# Patient Record
Sex: MALE | Race: WHITE | NOT HISPANIC OR LATINO | Employment: OTHER | URBAN - METROPOLITAN AREA
[De-identification: names, ages, dates, MRNs, and addresses within clinical notes are randomized per-mention and may not be internally consistent; named-entity substitution may affect disease eponyms.]

---

## 2023-04-11 LAB — URINE CULTURE: NORMAL

## 2023-04-28 LAB
ALANINE AMINOTRANSFERASE (SGPT) (U/L) IN SER/PLAS: 19 U/L (ref 10–52)
ALBUMIN (G/DL) IN SER/PLAS: 4.3 G/DL (ref 3.4–5)
ALKALINE PHOSPHATASE (U/L) IN SER/PLAS: 110 U/L (ref 33–136)
ANION GAP IN SER/PLAS: 12 MMOL/L (ref 10–20)
ASPARTATE AMINOTRANSFERASE (SGOT) (U/L) IN SER/PLAS: 26 U/L (ref 9–39)
BASOPHILS (10*3/UL) IN BLOOD BY AUTOMATED COUNT: 0.04 X10E9/L (ref 0–0.1)
BASOPHILS/100 LEUKOCYTES IN BLOOD BY AUTOMATED COUNT: 0.6 % (ref 0–2)
BILIRUBIN TOTAL (MG/DL) IN SER/PLAS: 0.8 MG/DL (ref 0–1.2)
CALCIUM (MG/DL) IN SER/PLAS: 9.1 MG/DL (ref 8.6–10.6)
CARBON DIOXIDE, TOTAL (MMOL/L) IN SER/PLAS: 31 MMOL/L (ref 21–32)
CHLORIDE (MMOL/L) IN SER/PLAS: 104 MMOL/L (ref 98–107)
CHOLESTEROL (MG/DL) IN SER/PLAS: 151 MG/DL (ref 0–199)
CHOLESTEROL IN HDL (MG/DL) IN SER/PLAS: 45.4 MG/DL
CHOLESTEROL/HDL RATIO: 3.3
CREATININE (MG/DL) IN SER/PLAS: 0.77 MG/DL (ref 0.5–1.3)
EOSINOPHILS (10*3/UL) IN BLOOD BY AUTOMATED COUNT: 0.28 X10E9/L (ref 0–0.4)
EOSINOPHILS/100 LEUKOCYTES IN BLOOD BY AUTOMATED COUNT: 4.2 % (ref 0–6)
ERYTHROCYTE DISTRIBUTION WIDTH (RATIO) BY AUTOMATED COUNT: 13.7 % (ref 11.5–14.5)
ERYTHROCYTE MEAN CORPUSCULAR HEMOGLOBIN CONCENTRATION (G/DL) BY AUTOMATED: 32.5 G/DL (ref 32–36)
ERYTHROCYTE MEAN CORPUSCULAR VOLUME (FL) BY AUTOMATED COUNT: 92 FL (ref 80–100)
ERYTHROCYTES (10*6/UL) IN BLOOD BY AUTOMATED COUNT: 4.29 X10E12/L (ref 4.5–5.9)
GFR MALE: >90 ML/MIN/1.73M2
GLUCOSE (MG/DL) IN SER/PLAS: 91 MG/DL (ref 74–99)
HEMATOCRIT (%) IN BLOOD BY AUTOMATED COUNT: 39.4 % (ref 41–52)
HEMOGLOBIN (G/DL) IN BLOOD: 12.8 G/DL (ref 13.5–17.5)
IMMATURE GRANULOCYTES/100 LEUKOCYTES IN BLOOD BY AUTOMATED COUNT: 0.3 % (ref 0–0.9)
LDL: 81 MG/DL (ref 0–99)
LEUKOCYTES (10*3/UL) IN BLOOD BY AUTOMATED COUNT: 6.6 X10E9/L (ref 4.4–11.3)
LYMPHOCYTES (10*3/UL) IN BLOOD BY AUTOMATED COUNT: 2.1 X10E9/L (ref 0.8–3)
LYMPHOCYTES/100 LEUKOCYTES IN BLOOD BY AUTOMATED COUNT: 31.8 % (ref 13–44)
MONOCYTES (10*3/UL) IN BLOOD BY AUTOMATED COUNT: 0.5 X10E9/L (ref 0.05–0.8)
MONOCYTES/100 LEUKOCYTES IN BLOOD BY AUTOMATED COUNT: 7.6 % (ref 2–10)
NEUTROPHILS (10*3/UL) IN BLOOD BY AUTOMATED COUNT: 3.66 X10E9/L (ref 1.6–5.5)
NEUTROPHILS/100 LEUKOCYTES IN BLOOD BY AUTOMATED COUNT: 55.5 % (ref 40–80)
NRBC (PER 100 WBCS) BY AUTOMATED COUNT: 0 /100 WBC (ref 0–0)
PLATELETS (10*3/UL) IN BLOOD AUTOMATED COUNT: 229 X10E9/L (ref 150–450)
POTASSIUM (MMOL/L) IN SER/PLAS: 3.9 MMOL/L (ref 3.5–5.3)
PROSTATE SPECIFIC AG (NG/ML) IN SER/PLAS: <0.1 NG/ML (ref 0–4)
PROTEIN TOTAL: 6.2 G/DL (ref 6.4–8.2)
SODIUM (MMOL/L) IN SER/PLAS: 143 MMOL/L (ref 136–145)
TESTOSTERONE (NG/DL) IN SER/PLAS: <60 NG/DL (ref 240–1000)
TRIGLYCERIDE (MG/DL) IN SER/PLAS: 123 MG/DL (ref 0–149)
UREA NITROGEN (MG/DL) IN SER/PLAS: 18 MG/DL (ref 6–23)
VLDL: 25 MG/DL (ref 0–40)

## 2023-05-09 ENCOUNTER — HOSPITAL ENCOUNTER (OUTPATIENT)
Dept: DATA CONVERSION | Facility: HOSPITAL | Age: 73
End: 2023-05-09
Attending: UROLOGY | Admitting: UROLOGY
Payer: MEDICARE

## 2023-05-09 DIAGNOSIS — I35.0 NONRHEUMATIC AORTIC (VALVE) STENOSIS: ICD-10-CM

## 2023-05-09 DIAGNOSIS — N39.3 STRESS INCONTINENCE (FEMALE) (MALE): ICD-10-CM

## 2023-05-09 DIAGNOSIS — R32 UNSPECIFIED URINARY INCONTINENCE: ICD-10-CM

## 2023-05-09 DIAGNOSIS — C61 MALIGNANT NEOPLASM OF PROSTATE (MULTI): ICD-10-CM

## 2023-05-09 DIAGNOSIS — I25.10 ATHEROSCLEROTIC HEART DISEASE OF NATIVE CORONARY ARTERY WITHOUT ANGINA PECTORIS: ICD-10-CM

## 2023-05-09 DIAGNOSIS — N32.89 OTHER SPECIFIED DISORDERS OF BLADDER: ICD-10-CM

## 2023-05-09 DIAGNOSIS — I10 ESSENTIAL (PRIMARY) HYPERTENSION: ICD-10-CM

## 2023-05-09 DIAGNOSIS — I25.2 OLD MYOCARDIAL INFARCTION: ICD-10-CM

## 2023-05-09 DIAGNOSIS — E78.00 PURE HYPERCHOLESTEROLEMIA, UNSPECIFIED: ICD-10-CM

## 2023-05-09 DIAGNOSIS — N30.40 IRRADIATION CYSTITIS WITHOUT HEMATURIA: ICD-10-CM

## 2023-07-24 LAB
ALANINE AMINOTRANSFERASE (SGPT) (U/L) IN SER/PLAS: 14 U/L (ref 10–52)
ALBUMIN (G/DL) IN SER/PLAS: 4.3 G/DL (ref 3.4–5)
ALKALINE PHOSPHATASE (U/L) IN SER/PLAS: 93 U/L (ref 33–136)
ANION GAP IN SER/PLAS: 11 MMOL/L (ref 10–20)
ASPARTATE AMINOTRANSFERASE (SGOT) (U/L) IN SER/PLAS: 22 U/L (ref 9–39)
BASOPHILS (10*3/UL) IN BLOOD BY AUTOMATED COUNT: 0.03 X10E9/L (ref 0–0.1)
BASOPHILS/100 LEUKOCYTES IN BLOOD BY AUTOMATED COUNT: 0.4 % (ref 0–2)
BILIRUBIN TOTAL (MG/DL) IN SER/PLAS: 1.3 MG/DL (ref 0–1.2)
CALCIUM (MG/DL) IN SER/PLAS: 9.6 MG/DL (ref 8.6–10.6)
CARBON DIOXIDE, TOTAL (MMOL/L) IN SER/PLAS: 32 MMOL/L (ref 21–32)
CHLORIDE (MMOL/L) IN SER/PLAS: 104 MMOL/L (ref 98–107)
CREATININE (MG/DL) IN SER/PLAS: 0.74 MG/DL (ref 0.5–1.3)
EOSINOPHILS (10*3/UL) IN BLOOD BY AUTOMATED COUNT: 0.14 X10E9/L (ref 0–0.4)
EOSINOPHILS/100 LEUKOCYTES IN BLOOD BY AUTOMATED COUNT: 2.1 % (ref 0–6)
ERYTHROCYTE DISTRIBUTION WIDTH (RATIO) BY AUTOMATED COUNT: 13.3 % (ref 11.5–14.5)
ERYTHROCYTE MEAN CORPUSCULAR HEMOGLOBIN CONCENTRATION (G/DL) BY AUTOMATED: 32.6 G/DL (ref 32–36)
ERYTHROCYTE MEAN CORPUSCULAR VOLUME (FL) BY AUTOMATED COUNT: 92 FL (ref 80–100)
ERYTHROCYTES (10*6/UL) IN BLOOD BY AUTOMATED COUNT: 4.25 X10E12/L (ref 4.5–5.9)
GFR MALE: >90 ML/MIN/1.73M2
GLUCOSE (MG/DL) IN SER/PLAS: 114 MG/DL (ref 74–99)
HEMATOCRIT (%) IN BLOOD BY AUTOMATED COUNT: 39 % (ref 41–52)
HEMOGLOBIN (G/DL) IN BLOOD: 12.7 G/DL (ref 13.5–17.5)
IMMATURE GRANULOCYTES/100 LEUKOCYTES IN BLOOD BY AUTOMATED COUNT: 0.3 % (ref 0–0.9)
LEUKOCYTES (10*3/UL) IN BLOOD BY AUTOMATED COUNT: 6.8 X10E9/L (ref 4.4–11.3)
LYMPHOCYTES (10*3/UL) IN BLOOD BY AUTOMATED COUNT: 0.95 X10E9/L (ref 0.8–3)
LYMPHOCYTES/100 LEUKOCYTES IN BLOOD BY AUTOMATED COUNT: 14 % (ref 13–44)
MONOCYTES (10*3/UL) IN BLOOD BY AUTOMATED COUNT: 0.57 X10E9/L (ref 0.05–0.8)
MONOCYTES/100 LEUKOCYTES IN BLOOD BY AUTOMATED COUNT: 8.4 % (ref 2–10)
NEUTROPHILS (10*3/UL) IN BLOOD BY AUTOMATED COUNT: 5.1 X10E9/L (ref 1.6–5.5)
NEUTROPHILS/100 LEUKOCYTES IN BLOOD BY AUTOMATED COUNT: 74.8 % (ref 40–80)
NRBC (PER 100 WBCS) BY AUTOMATED COUNT: 0 /100 WBC (ref 0–0)
PLATELETS (10*3/UL) IN BLOOD AUTOMATED COUNT: 206 X10E9/L (ref 150–450)
POTASSIUM (MMOL/L) IN SER/PLAS: 4.5 MMOL/L (ref 3.5–5.3)
PROSTATE SPECIFIC AG (NG/ML) IN SER/PLAS: <0.1 NG/ML (ref 0–4)
PROTEIN TOTAL: 6.1 G/DL (ref 6.4–8.2)
SODIUM (MMOL/L) IN SER/PLAS: 142 MMOL/L (ref 136–145)
TESTOSTERONE (NG/DL) IN SER/PLAS: <60 NG/DL (ref 240–1000)
UREA NITROGEN (MG/DL) IN SER/PLAS: 16 MG/DL (ref 6–23)

## 2023-08-18 PROBLEM — N32.81 OVERACTIVE BLADDER: Status: ACTIVE | Noted: 2023-08-18

## 2023-08-18 PROBLEM — R06.09 EXERTIONAL DYSPNEA: Status: ACTIVE | Noted: 2023-08-18

## 2023-08-18 PROBLEM — I25.10 ARTERIOSCLEROTIC CORONARY ARTERY DISEASE: Status: ACTIVE | Noted: 2023-08-18

## 2023-08-18 PROBLEM — E66.9 OBESITY, CLASS I, BMI 30.0-34.9 (SEE ACTUAL BMI): Status: ACTIVE | Noted: 2023-08-18

## 2023-08-18 PROBLEM — R07.9 CHEST PAIN: Status: ACTIVE | Noted: 2023-08-18

## 2023-08-18 PROBLEM — E78.00 HYPERCHOLESTEROLEMIA: Status: ACTIVE | Noted: 2023-08-18

## 2023-08-18 PROBLEM — I35.0 AORTIC STENOSIS: Status: ACTIVE | Noted: 2023-08-18

## 2023-08-18 PROBLEM — N30.40 RADIATION CYSTITIS: Status: ACTIVE | Noted: 2023-08-18

## 2023-08-18 PROBLEM — I10 BENIGN ESSENTIAL HTN: Status: ACTIVE | Noted: 2023-08-18

## 2023-08-18 PROBLEM — R32 INCONTINENCE OF URINE: Status: ACTIVE | Noted: 2023-08-18

## 2023-08-18 PROBLEM — R07.89 CHEST PRESSURE: Status: ACTIVE | Noted: 2023-08-18

## 2023-08-18 PROBLEM — Z01.810 PRE-PROCEDURAL CARDIOVASCULAR EXAMINATION: Status: ACTIVE | Noted: 2023-08-18

## 2023-08-18 PROBLEM — R39.9 LOWER URINARY TRACT SYMPTOMS: Status: ACTIVE | Noted: 2023-08-18

## 2023-08-18 PROBLEM — R31.9 HEMATURIA: Status: ACTIVE | Noted: 2023-08-18

## 2023-08-18 PROBLEM — C61 MALIGNANT NEOPLASM OF PROSTATE (MULTI): Status: ACTIVE | Noted: 2023-08-18

## 2023-08-18 PROBLEM — I25.2 STATUS POST NON-ST ELEVATION MYOCARDIAL INFARCTION (NSTEMI): Status: ACTIVE | Noted: 2023-08-18

## 2023-08-18 PROBLEM — R63.0 POOR APPETITE: Status: ACTIVE | Noted: 2023-08-18

## 2023-08-18 PROBLEM — E66.811 OBESITY, CLASS I, BMI 30.0-34.9 (SEE ACTUAL BMI): Status: ACTIVE | Noted: 2023-08-18

## 2023-08-18 PROBLEM — R33.9 URINARY RETENTION: Status: ACTIVE | Noted: 2023-08-18

## 2023-08-18 PROBLEM — E08.311 ADVANCED DIABETIC MACULOPATHY WITH RETINOPATHY AND MACULAR EDEMA ASSOCIATED WITH DIABETES MELLITUS DUE TO UNDERLYING CONDITION (MULTI): Status: ACTIVE | Noted: 2023-08-18

## 2023-08-18 RX ORDER — ABIRATERONE ACETATE 250 MG/1
4 TABLET ORAL
COMMUNITY
Start: 2022-04-28 | End: 2023-10-09 | Stop reason: SDUPTHER

## 2023-08-18 RX ORDER — AMLODIPINE BESYLATE 5 MG/1
1 TABLET ORAL DAILY
COMMUNITY
End: 2024-03-18 | Stop reason: SDUPTHER

## 2023-08-18 RX ORDER — MIRABEGRON 50 MG/1
1 TABLET, FILM COATED, EXTENDED RELEASE ORAL DAILY
COMMUNITY
Start: 2023-05-08 | End: 2024-01-18 | Stop reason: WASHOUT

## 2023-08-18 RX ORDER — FUROSEMIDE 20 MG/1
1 TABLET ORAL DAILY
COMMUNITY
Start: 2023-02-16 | End: 2023-10-09 | Stop reason: ALTCHOICE

## 2023-08-18 RX ORDER — METOPROLOL SUCCINATE 50 MG/1
1 TABLET, EXTENDED RELEASE ORAL DAILY
COMMUNITY

## 2023-08-18 RX ORDER — KETOCONAZOLE 20 MG/G
1 CREAM TOPICAL 2 TIMES DAILY PRN
COMMUNITY
Start: 2023-01-23

## 2023-08-18 RX ORDER — CHOLECALCIFEROL (VITAMIN D3) 25 MCG
1 TABLET ORAL DAILY
COMMUNITY
End: 2023-10-18 | Stop reason: ALTCHOICE

## 2023-08-18 RX ORDER — OXYBUTYNIN CHLORIDE 5 MG/1
1 TABLET ORAL 4 TIMES DAILY
COMMUNITY
Start: 2022-10-15 | End: 2023-10-09 | Stop reason: ALTCHOICE

## 2023-08-18 RX ORDER — MULTIVIT-MIN/IRON/FOLIC ACID/K 45-800-120
1 CAPSULE ORAL
COMMUNITY
End: 2024-05-10 | Stop reason: WASHOUT

## 2023-08-18 RX ORDER — PANTOPRAZOLE SODIUM 40 MG/1
1 TABLET, DELAYED RELEASE ORAL DAILY
COMMUNITY
Start: 2023-04-13

## 2023-08-18 RX ORDER — HYDROCHLOROTHIAZIDE 12.5 MG/1
1 TABLET ORAL EVERY MORNING
COMMUNITY
Start: 2023-04-18 | End: 2023-10-09 | Stop reason: ALTCHOICE

## 2023-08-18 RX ORDER — CLOPIDOGREL BISULFATE 75 MG/1
1 TABLET ORAL DAILY
COMMUNITY
Start: 2022-10-10 | End: 2023-10-09 | Stop reason: ALTCHOICE

## 2023-08-18 RX ORDER — ATORVASTATIN CALCIUM 80 MG/1
1 TABLET, FILM COATED ORAL DAILY
COMMUNITY
End: 2024-05-10 | Stop reason: WASHOUT

## 2023-08-18 RX ORDER — PSEUDOEPHEDRINE HCL 30 MG
1 TABLET ORAL DAILY
COMMUNITY
End: 2023-10-18 | Stop reason: ALTCHOICE

## 2023-08-18 RX ORDER — SULFAMETHOXAZOLE AND TRIMETHOPRIM 800; 160 MG/1; MG/1
1 TABLET ORAL 2 TIMES DAILY
COMMUNITY
Start: 2022-09-26 | End: 2023-10-09 | Stop reason: ALTCHOICE

## 2023-08-18 RX ORDER — ASPIRIN 81 MG/1
1 TABLET ORAL DAILY
COMMUNITY
Start: 2022-07-27

## 2023-08-18 RX ORDER — POLYETHYLENE GLYCOL 3350 17 G/17G
17 POWDER, FOR SOLUTION ORAL AS NEEDED
COMMUNITY
Start: 2022-10-10

## 2023-08-18 RX ORDER — DOXYCYCLINE 100 MG/1
1 CAPSULE ORAL 2 TIMES DAILY
COMMUNITY
Start: 2023-01-26 | End: 2023-10-09 | Stop reason: ALTCHOICE

## 2023-08-18 RX ORDER — AMOXICILLIN AND CLAVULANATE POTASSIUM 875; 125 MG/1; MG/1
1 TABLET, FILM COATED ORAL EVERY 12 HOURS
COMMUNITY
Start: 2022-10-15 | End: 2023-10-09 | Stop reason: ALTCHOICE

## 2023-08-18 RX ORDER — MELOXICAM 15 MG/1
1 TABLET ORAL AS NEEDED
COMMUNITY

## 2023-08-18 RX ORDER — PENTOSAN POLYSULFATE SODIUM 100 MG/1
1 CAPSULE, GELATIN COATED ORAL 3 TIMES DAILY
COMMUNITY
Start: 2022-10-24 | End: 2023-10-09 | Stop reason: ALTCHOICE

## 2023-08-18 RX ORDER — DOCUSATE SODIUM 100 MG/1
1 CAPSULE, LIQUID FILLED ORAL 2 TIMES DAILY
COMMUNITY
Start: 2022-10-10

## 2023-08-18 RX ORDER — CIPROFLOXACIN 500 MG/1
1 TABLET ORAL 2 TIMES DAILY
COMMUNITY
Start: 2022-08-11 | End: 2023-10-09 | Stop reason: ALTCHOICE

## 2023-08-18 RX ORDER — AMLODIPINE BESYLATE 2.5 MG/1
1 TABLET ORAL DAILY
COMMUNITY
Start: 2023-02-16 | End: 2023-10-09

## 2023-08-18 RX ORDER — PREDNISONE 5 MG/1
1 TABLET ORAL 2 TIMES DAILY
COMMUNITY
Start: 2022-04-28 | End: 2023-10-18 | Stop reason: ALTCHOICE

## 2023-08-18 RX ORDER — NITROGLYCERIN 0.4 MG/1
1 TABLET SUBLINGUAL EVERY 5 MIN PRN
COMMUNITY

## 2023-09-07 VITALS — WEIGHT: 224.87 LBS | HEIGHT: 68 IN | BODY MASS INDEX: 34.08 KG/M2

## 2023-09-14 PROBLEM — E78.5 HYPERLIPOPROTEINEMIA: Status: ACTIVE | Noted: 2023-09-14

## 2023-09-14 PROBLEM — Z96.0 URINARY CATHETER PRESENT: Status: ACTIVE | Noted: 2023-09-14

## 2023-09-14 PROBLEM — R59.0 LYMPHADENOPATHY, ABDOMINAL: Status: ACTIVE | Noted: 2023-09-14

## 2023-09-14 PROBLEM — R10.9 ABDOMINAL PAIN: Status: ACTIVE | Noted: 2023-09-14

## 2023-09-14 PROBLEM — Z90.79 STATUS POST PROSTATECTOMY: Status: ACTIVE | Noted: 2023-09-14

## 2023-09-14 PROBLEM — D50.0 ANEMIA DUE TO BLOOD LOSS: Status: ACTIVE | Noted: 2023-09-14

## 2023-09-14 PROBLEM — Z04.9 CONDITION NOT FOUND: Status: ACTIVE | Noted: 2023-09-14

## 2023-09-14 PROBLEM — T83.9XXA FOLEY CATHETER PROBLEM (CMS-HCC): Status: ACTIVE | Noted: 2023-09-14

## 2023-09-14 PROBLEM — N32.89 BLADDER SPASMS: Status: ACTIVE | Noted: 2023-09-14

## 2023-09-14 PROBLEM — K80.20 CHOLELITHIASIS: Status: ACTIVE | Noted: 2023-09-14

## 2023-09-14 PROBLEM — T83.098A: Status: ACTIVE | Noted: 2023-09-14

## 2023-09-14 PROBLEM — N39.3 MALE URINARY STRESS INCONTINENCE: Status: ACTIVE | Noted: 2023-09-14

## 2023-09-14 PROBLEM — R79.89 LOW SERUM TESTOSTERONE LEVEL IN MALE: Status: ACTIVE | Noted: 2023-09-14

## 2023-09-14 PROBLEM — I21.4 NSTEMI (NON-ST ELEVATED MYOCARDIAL INFARCTION) (MULTI): Status: ACTIVE | Noted: 2023-09-14

## 2023-09-14 PROBLEM — R31.0 GROSS HEMATURIA: Status: ACTIVE | Noted: 2023-09-14

## 2023-09-14 PROBLEM — R59.0 LYMPHADENOPATHY, MEDIASTINAL: Status: ACTIVE | Noted: 2023-09-14

## 2023-09-14 RX ORDER — AA/PROT/LYSINE/METHIO/VIT C/B6 50-12.5 MG
10 TABLET ORAL DAILY
COMMUNITY

## 2023-09-14 RX ORDER — VITAMIN B COMPLEX
1 CAPSULE ORAL DAILY
COMMUNITY
End: 2023-10-18 | Stop reason: ALTCHOICE

## 2023-09-14 NOTE — H&P
History & Physical Reviewed:   I have reviewed the History and Physical dated:  09-May-2023   History and Physical reviewed and relevant findings noted. Patient examined to review pertinent physical  findings.: No significant changes   Home Medications Reviewed: no changes noted   Allergies Reviewed: no changes noted       ERAS (Enhanced Recovery After Surgery):  ·  ERAS Patient: no     Consent:   COVID-19 Consent:  ·  COVID-19 Risk Consent Surgeon has reviewed key risks related to the risk of ramon COVID-19 and if they contract COVID-19 what the risks are.     Attestation:   Note Completion:  I am a:  Resident/Fellow   Attending Attestation I saw and evaluated the patient.  I personally obtained the key and critical portions of the history and physical exam or was physically present for key and  critical portions performed by the resident/fellow. I reviewed the resident/fellow?s documentation and discussed the patient with the resident/fellow.  I agree with the resident/fellow?s medical decision making as documented in the note.     I personally evaluated the patient on 09-May-2023         Electronic Signatures:  Howard Cespedes (Resident))  (Signed 09-May-2023 06:08)   Authored: History & Physical Reviewed, ERAS, Consent,  Note Completion  Victor Manuel Tang)  (Signed 09-May-2023 12:23)   Authored: Note Completion   Co-Signer: History & Physical Reviewed, ERAS, Consent, Note Completion      Last Updated: 09-May-2023 12:23 by Victor Manuel Tang)

## 2023-09-18 LAB
ANION GAP IN SER/PLAS: 11 MMOL/L (ref 10–20)
CALCIUM (MG/DL) IN SER/PLAS: 9.1 MG/DL (ref 8.6–10.3)
CARBON DIOXIDE, TOTAL (MMOL/L) IN SER/PLAS: 29 MMOL/L (ref 21–32)
CHLORIDE (MMOL/L) IN SER/PLAS: 106 MMOL/L (ref 98–107)
CREATININE (MG/DL) IN SER/PLAS: 0.81 MG/DL (ref 0.5–1.3)
ERYTHROCYTE DISTRIBUTION WIDTH (RATIO) BY AUTOMATED COUNT: 13.2 % (ref 11.5–14.5)
ERYTHROCYTE MEAN CORPUSCULAR HEMOGLOBIN CONCENTRATION (G/DL) BY AUTOMATED: 32.5 G/DL (ref 32–36)
ERYTHROCYTE MEAN CORPUSCULAR VOLUME (FL) BY AUTOMATED COUNT: 92 FL (ref 80–100)
ERYTHROCYTES (10*6/UL) IN BLOOD BY AUTOMATED COUNT: 4.03 X10E12/L (ref 4.5–5.9)
GFR MALE: >90 ML/MIN/1.73M2
GLUCOSE (MG/DL) IN SER/PLAS: 114 MG/DL (ref 74–99)
HEMATOCRIT (%) IN BLOOD BY AUTOMATED COUNT: 36.9 % (ref 41–52)
HEMOGLOBIN (G/DL) IN BLOOD: 12 G/DL (ref 13.5–17.5)
LEUKOCYTES (10*3/UL) IN BLOOD BY AUTOMATED COUNT: 5.9 X10E9/L (ref 4.4–11.3)
PLATELETS (10*3/UL) IN BLOOD AUTOMATED COUNT: 171 X10E9/L (ref 150–450)
POTASSIUM (MMOL/L) IN SER/PLAS: 4.1 MMOL/L (ref 3.5–5.3)
SODIUM (MMOL/L) IN SER/PLAS: 142 MMOL/L (ref 136–145)
UREA NITROGEN (MG/DL) IN SER/PLAS: 16 MG/DL (ref 6–23)

## 2023-09-22 ENCOUNTER — HOSPITAL ENCOUNTER (OUTPATIENT)
Dept: DATA CONVERSION | Facility: HOSPITAL | Age: 73
End: 2023-09-22
Attending: INTERNAL MEDICINE | Admitting: INTERNAL MEDICINE
Payer: MEDICARE

## 2023-09-22 DIAGNOSIS — I25.110 ATHEROSCLEROTIC HEART DISEASE OF NATIVE CORONARY ARTERY WITH UNSTABLE ANGINA PECTORIS (MULTI): ICD-10-CM

## 2023-09-22 DIAGNOSIS — I25.10 ATHEROSCLEROTIC HEART DISEASE OF NATIVE CORONARY ARTERY WITHOUT ANGINA PECTORIS: ICD-10-CM

## 2023-09-22 DIAGNOSIS — R06.02 SHORTNESS OF BREATH: ICD-10-CM

## 2023-09-22 DIAGNOSIS — I08.0 RHEUMATIC DISORDERS OF BOTH MITRAL AND AORTIC VALVES: ICD-10-CM

## 2023-09-22 DIAGNOSIS — R06.00 DYSPNEA, UNSPECIFIED: ICD-10-CM

## 2023-09-22 DIAGNOSIS — R07.89 OTHER CHEST PAIN: ICD-10-CM

## 2023-09-22 DIAGNOSIS — E78.5 HYPERLIPIDEMIA, UNSPECIFIED: ICD-10-CM

## 2023-09-22 DIAGNOSIS — I10 ESSENTIAL (PRIMARY) HYPERTENSION: ICD-10-CM

## 2023-09-30 NOTE — H&P
History & Physical Reviewed:   I have reviewed the History and Physical dated:  18-Sep-2023   History and Physical reviewed and relevant findings noted. Patient examined to review pertinent physical  findings.: No significant changes   Home Medications Reviewed: no changes noted   Allergies Reviewed: no changes noted       Airway/Sedation Assessment:  ·  Emotional Status calm   ·  Neurologic alert & oriented x 3   ·  Respiratory clear to auscultation   ·  Cardiovascular rhythm & rate regular  + murmur   ·  Mouth Opening OK yes   ·  Neck Flexibility OK yes   ·  Loose Teeth no   ·  Oropharyngeal Classification Class III   ·  ASA PS Classification ASA III   ·  Sedation Plan moderate sedation       ERAS (Enhanced Recovery After Surgery):  ·  ERAS Patient: no     Consent:   COVID-19 Consent:  ·  COVID-19 Risk Consent Surgeon has reviewed key risks related to the risk of ramon COVID-19 and if they contract COVID-19 what the risks are.     Assessment/Plan:   ·  Assessment and Plan    Impression: shortness of breath on exertion, fatigue, hx of CAD with prior PCI    Plan: [facility]      Electronic Signatures:  Socorro Matthews (APRN-CNP)  (Signed 22-Sep-2023 08:38)   Authored: History & Physical Reviewed, Airway/Sedation,  ERAS, Consent, Assessment/Plan, Note Completion      Last Updated: 22-Sep-2023 08:38 by Socorro Matthews (APRN-CNP)

## 2023-10-02 NOTE — PROGRESS NOTES
"Counseling:  The patient was counseled regarding diagnostic results, instructions for management, risk factor reductions, prognosis, patient and family education, impressions, risks and benefits of treatment options and importance of compliance with treatment.      Chief Complaint:   The patient presents today for 3-week followup of exertional SOB, chest pressure and fatigue s/p LHC and echocardiogram.       History Of Present Illness:    Moises Graham is a 73 year old male patient who presents today for 3-week  followup of exertional SOB, chest pressure and fatigue s/p LHC and echocardiogram. His PMH is significant for advanced diabetic maculopathy, prostate CA, CAD with h/o NSTEMI s/p PCI of LAD 07/20/2022, HTN, and hyperlipidemia. The patient was last seen 09/18/2023 by Nancy Cruz NP, at which time the patient reported worsening exertional SOB, exertional chest pressure and fatigue. Based on this report, an echocardiogram and LHC was ordered. On 09/22/2023, echocardiogram demonstrated an EF of 60-65%. basal septal hypertrophy (sigmoid septum), mild MR, and aortic gradient appearing to be predominantly subaortic at the level of LVOT due to sigmoid shaped septum with an elevated flow across LVOT as suggested by elevated LVOT VTI, and LHC revealed mild non-obstructive CAD. Today, the patient states that he has recovered well from LHC; however, he continues to report exertional SOB, chest pressure and fatigue. He also reports BLE discomfort and intermittent claudication. He is able to use an Elliptical, but has difficulties using a treadmill s/t LE fatigue and discomfort. BP is stable at home. The patient is compliant with his prescribed medications.      Last Recorded Vitals:  Vitals:    10/09/23 1133   BP: 120/80   BP Location: Right arm   Pulse: 70   Weight: 102 kg (225 lb 9.6 oz)   Height: 1.727 m (5' 8\")     Past Surgical History:  He has a past surgical history that includes CT angio coronary art with " heartflow if score >30% (7/8/2022).      Social History:  He reports that he has never smoked. He has never used smokeless tobacco. He reports current alcohol use of about 4.0 standard drinks of alcohol per week. He reports that he does not use drugs.    Family History:  Family History   Family history unknown: Yes      Allergies:  Patient has no known allergies.    Outpatient Medications:  Current Outpatient Medications   Medication Instructions    abiraterone (Zytiga) 250 mg tablet TAKE FOUR (4) TABLETS BY MOUTH ONCE A DAY IN THE MORNING. NO FOOD OR DRINK 2 HOURS BEFORE OR 1 HOUR AFTER ADMINISTRATION.    amLODIPine (NORVASC) 1 mg, oral, Daily    aspirin 81 mg EC tablet 1 tablet, oral, Daily    atorvastatin (Lipitor) 80 mg tablet 1 tablet, oral, Daily    b complex vitamins capsule 1 capsule, oral, Daily    calcium citrate 250 mg calcium tablet 1 tablet, oral, Daily    cholecalciferol (Vitamin D-3) 25 MCG (1000 UT) tablet 1 tablet, oral, Daily    coenzyme Q-10 (CO Q-10) 10 mg, oral, Daily    docusate sodium (Colace) 100 mg capsule 1 capsule, oral, 2 times daily    ketoconazole (NIZOral) 2 % cream 1 Application, Topical, 2 times daily, Apply a thin layer to affected areas    meloxicam (Mobic) 15 mg tablet 1 tablet, oral, As needed, Monday, Wednesday, and Friday    metoprolol succinate XL (Toprol-XL) 50 mg 24 hr tablet 1 tablet, oral, Daily    multivitamin-min-iron-FA-vit K (Bariatric Multivitamins) 45 mg iron- 800 mcg-120 mcg capsule 1 capsule, oral    Myrbetriq 50 mg tablet extended release 24 hr 24 hr tablet 1 tablet, oral, Daily    nitroglycerin (Nitrostat) 0.4 mg SL tablet 1 tablet, oral, Every 5 min PRN    pantoprazole (ProtoNix) 40 mg EC tablet 1 tablet, oral, Daily    polyethylene glycol (GLYCOLAX, MIRALAX) 17 g, oral, Once    predniSONE (Deltasone) 10 mg tablet TAKE ONE (1) TABLET BY MOUTH ONCE A DAY    predniSONE (Deltasone) 5 mg tablet 1 tablet, oral, 2 times daily    rosuvastatin (CRESTOR) 40 mg, oral,  Daily, Finish atorvastain before starting    vitamin-B complex split tablet 1 half tablet, oral, Daily     Review of Systems   Constitutional: Positive for malaise/fatigue.   Cardiovascular:  Positive for chest pain (pressure), claudication (intermittent) and dyspnea on exertion.   All other systems reviewed and are negative.     Physical Exam:  Constitutional:       Appearance: Healthy appearance. Not in distress.   Neck:      Vascular: No JVR. JVD normal.   Pulmonary:      Effort: Pulmonary effort is normal.      Breath sounds: Normal breath sounds. No wheezing. No rhonchi. No rales.   Chest:      Chest wall: Not tender to palpatation.   Cardiovascular:      PMI at left midclavicular line. Normal rate. Regular rhythm. Normal S1. Normal S2.       Murmurs: There is no murmur.      No gallop.  No click. No rub.   Pulses:     Intact distal pulses.   Edema:     Peripheral edema absent.   Abdominal:      General: Bowel sounds are normal.      Palpations: Abdomen is soft.      Tenderness: There is no abdominal tenderness.   Musculoskeletal: Normal range of motion.         General: No tenderness. Skin:     General: Skin is warm and dry.   Neurological:      General: No focal deficit present.      Mental Status: Alert and oriented to person, place and time.        Last Labs:  CBC -  Lab Results   Component Value Date    WBC 5.9 09/18/2023    HGB 12.0 (L) 09/18/2023    HCT 36.9 (L) 09/18/2023    MCV 92 09/18/2023     09/18/2023       CMP -  Lab Results   Component Value Date    CALCIUM 9.1 09/18/2023    PROT 4.9 (L) 08/20/2023    ALBUMIN 2.9 (L) 08/20/2023    AST 32 08/20/2023    ALT 22 08/20/2023    ALKPHOS 93 08/20/2023    BILITOT 1.7 (H) 08/20/2023       LIPID PANEL -   Lab Results   Component Value Date    CHOL 151 04/28/2023    TRIG 123 04/28/2023    HDL 45.4 04/28/2023    CHHDL 3.3 04/28/2023    LDLF 81 04/28/2023    VLDL 25 04/28/2023       RENAL FUNCTION PANEL -   Lab Results   Component Value Date    GLUCOSE  114 (H) 09/18/2023     09/18/2023    K 4.1 09/18/2023     09/18/2023    CO2 29 09/18/2023    ANIONGAP 11 09/18/2023    BUN 16 09/18/2023    CREATININE 0.81 09/18/2023    GFRMALE >90 09/18/2023    CALCIUM 9.1 09/18/2023    ALBUMIN 2.9 (L) 08/20/2023        Lab Results   Component Value Date    BNP 56 06/21/2022       Last Cardiology Tests:  09/22/2023 - Cardiac Catheterization (LH)  1. Mild non-obstructive coronary artery disease.  2. Left Ventricular end-diastolic pressure = 7.  3. Normal LV filling pressures.    09/22/2023 - TTE  1. Left ventricular systolic function is normal with a 60-65% estimated ejection fraction.  2. Spectral Doppler shows an impaired relaxation pattern of left ventricular diastolic filling.  3. Moderately elevated right ventricular systolic pressure.  4. There is basal septal hypertrophy(sigmoid septum).  5. Mild mitral valve regurgitation.  6. Aortic valve area of 2.27 cm2 (1.06 indexed ZARA) by continuity equation. Peak velocity of 2.27 m/s across AV. Peak and mean gradient of 20 mmHg and 12 mmHg respectively across the AV. The gradient appears to be predominantly subaortic at the level of LVOT due to sigmoid shaped septum. There is elevated flow across LVOT as suggested by elevated LVOT VTI. Can consider repeat study with valsalva to evaluate for provoked gradients if clinically indicated. Aortic valve DI of 0.72 and strove volume index of 40 L/min/m2.    01/25/2023 - TTE  1. Left ventricular systolic function is normal with a 55-60% estimated ejection fraction.  2. Spectral Doppler shows a pseudonormal pattern of left ventricular diastolic filling.  3. There is moderate concentric left ventricular hypertrophy.  4. Mild aortic valve stenosis.    01/05/2023 - Stress Test  1. No clinical evidence for ischemia at maximal workload.  2. Normal perfusion without evidence of ischemia or prior infarct. Calculated ejection fraction is 66% without segmental wall motion abnormality seen.      07/20/2022 - Cardiac Catheterization (LH)  1. Severe 1-vessel CAD involving mid LAD (RFR 0.86).  2. Mid LAD bridge.  3. Successful PCI of hemodynamically significant mid LAD 70% stenosis (RFR 0.86) with 3.5 x 18 mm Resolute [place] stent post-dilated with 3.5 mm NC balloon.    06/15/2022 - Onco-Cardiology TTE  1. The left ventricular systolic function is normal with a 65-70% estimated ejection fraction.  2. Spectral Doppler shows an impaired relaxation pattern of left ventricular diastolic filling.  3. RVSP within normal limits.  4. Compared with the prior exam from 9/14/2016 the LV function remains unchanged. GLS was not assessed on the prior study.    06/29/2020 - Stress Test  1. Borderline ST response to moderate peak workload max. ETT. Occ. PVC's with exercise. Systolic and diastolic hypertensive response to exercise.   2. Normal stress myocardial perfusion imaging. Well-maintained left ventricular function. 64%     Diagnostic review: I have personally reviewed the result(s) of the Echocardiogram and C.     Assessment/Plan   1) CAD s/p PCI of LAD in 7/2022, HOCM  He had been having recurrent episodes of Hematuria on ASA/Plavix  It was decided previously that since he had a single small stent in a 3.5 mm vessel and his risks of anticoagulation is much higher than benefits  Plavix was previously discontinued   Continue aspirin 81 mg daily, rosuvastatin 40 mg daily, amlodipine 5 mg daily   Stress 01/25/2023 negative for ischemia  TTE 01/25/2023 with LVEF 55-60, moderate concentric LVH and mild AS  Seen by Nancy Cruz NP, 09/18/2023 - reporting exertional SOB, exertional chest pressure, fatigue - echo and [facility] ordered  TTE 09/22/2023 with LVEF 60-65%, basal septal hypertrophy (sigmoid septum). mild MR; aortic valve area of 2.27 cm2, peak velocity of 2.27 m/s across AV, peak and mean gradient of 20 mmHg and 12 mmHg respectively across the AV, gradient appears to be predominantly subaortic at the level  of LVOT due to sigmoid shaped septum. With elevated flow across LVOT as suggested by elevated LVOT VTI.   [facility] 09/22/2023 with mild non-obstructive CAD  Reports persistent exertional SOB, chest pressure and fatigue  Check cardiac MRI    2) Hyperlipidemia  Goal LDL <70  Atorvastatin previously discontinued  On rosuvastatin 40 mg daily   Lipid panel 06/2022 with elevated LDL of 103   Due for repeat lipid panel 12/2023    3) HTN  Stable  On amlodipine 5 mg (increased from 2.5 mg on 09/18/2023), furosemide 20 mg daily, HCTZ 12.5 mg once daily  Improvement in LE edema with the furosemide - requesting alternative s/t urinary frequency  Furosemide previously discontinued s/t urinary frequency   Patient can increase HCTZ up to 25 mg if leg swelling persists/does not improve, but to call our office if he does     4) BLE Discomfort and Intermittent Claudication   Able to use Elliptical  Unable to tolerate treadmill  Check DIANNE      Scribe Attestation  By signing my name below, I, Sharlene Calderon   attest that this documentation has been prepared under the direction and in the presence of Jayson Tang MD.

## 2023-10-02 NOTE — OP NOTE
Post Operative Note:     PreOp Diagnosis: Male postsurgical stress urinary  incontinence   Post-Procedure Diagnosis: Same   Procedure: 1.  Artificial urinary sphincter placemen  2. adjacent tissue transfer and complex closure   Surgeon: ARRON Tang MD   Resident/Fellow/Other Assistant: BELTRAN Cespedes MD   Anesthesia: General - ETT   I.V. Fluids: Per Anesthesia   Estimated Blood Loss (mL): 5cc   Blood Replacement: None   Specimen: no   Complications: none   Findings: Plump urethra measuring 5cm;  with  4.5cm cuff and 24cc in standard PRB   Patient Returned To/Condition: Returned to PACU in  stable condition   Urine Output: Lost to field   Drains and/or Catheters: none   Implants:  AUS     Operative Report Dictated:  Dictation: not applicable - note contains Operative  Report   Operative Report:    OPERATIVE DETAILS:  Preoperatively the patient received vancomycin, gentamicin (3 mg/kg), fluconazole, subcutaneous heparin.    The patient was brought to the operating suite, laid supine on the  table.  A preoperative huddle was performed as per institutional  protocol. General anesthesia was induced.    He was placed in dorsal lithotomy position, prepped and draped in  standard sterile fashion.  We placed a 16Fr catheter per urethra into the  bladder.  We made a midline perineal incision, and carried this down through  subcutaneous tissue. The bulbospongiosis muscle was identified and  divided in the midline.  Self-retaining retractors were placed to  expose the bulbar urethra.     The dorsolateral attachments of the bulbar urethra over the crura were  divided. Kern fascia was perforated and circumferential urethral  control was obtained.  The dorsal attachments were further divided to  enable enough space for cuff placement.  The catheter was removed and  the measurement of urethra circumference using umbilical tape was done here which was 5  cm. We elected to proceed with a 4.5 cm cuff placement.     The  cuff was prepared in the back table per the  's standard recommendations. The wound was packed and we then  proceeded our attention to the right groin and a right inguinal incision was  made and carried down to subcutaneous tissue.  This was carried down  through Luca's, then through the external oblique fascia.  A  1.5 cm fasciotomy was made here and the rectus muscle underneath was  encountered and it was split along the line of its fiber and the  retrorectus preperitoneal space was encountered. The space was created  here towards the ipsilateral shoulder that would allow for the balloon to  go in. The balloon was passed in this space and inflated with 24 mL of  injectable saline.      Fascia closure was done with interrupted 1-0 Vicryl sutures.  We then created a hiatus through the inguinal incision below luca's fascia, but above the external oblique fascia, and carried this down to the scrotum. The scrotal dartos  with Luca's interface was perforated and a nice pocket was created  in the right dependent hemiscrotum anterior to the testicles. Herein the  control pump was brought in the tubing exiting in the inguinal area.  Once all of our tubings were in the groin incision, we turned our attention back to the perineal incision where we placed the cuff around the proximal bulbar urethra and this seated quite nicely. We then passed the tubing from the cuff from the perineal  incision to  the inguinal incision tunneling it subcutaneously superficial to the  pelvic bones.  These connections were tested and were noted to be  pretty firm.  The wounds were copiously irrigated.  We  cycled the device multiple times.  We closed the groin incision with Luca's first followed by deep  dermal and skin sutures, which were all absorbable sutures.  We then  focused on the perineal incision.  At the perineal incision, we began  a complex closure for which we reconstructed the bulbospongiosis  muscle in the  midline to provide coverage over the implant. The subcutaneous tissue and dartos  were closed with a running Vicryl and the skin was closed with a  running Monocryl.  Dermabond was applied.  The device was cycled  multiple times then left in the deactivated position. We were content with the results. This concluded the procedure. Scrotal fluffs and jock strap were placed and drapes were taken down. The patient was then awoken from anesthesia after re-positioning  in transferred to PACU in stable condition..    Attestation:   Note Completion:  I am a: Resident/Fellow   Attending Attestation I was present for the entire procedure          Electronic Signatures:  Howard Cespedes (Resident))  (Signed 09-May-2023 16:24)   Authored: Post Operative Note, Note Completion  Victor Manuel Tang)  (Signed 11-May-2023 11:13)   Authored: Post Operative Note, Note Completion   Co-Signer: Post Operative Note, Note Completion      Last Updated: 11-May-2023 11:13 by Victor Manuel Tang)

## 2023-10-02 NOTE — PATIENT INSTRUCTIONS
Continue all current medications as prescribed.   Dr. Tang has ordered an MRI of your heart to further evaluate your heart function and structure.  Dr. Tang has also ordered a pressure measurement ultrasound of your legs.   Followup with Dr. Tang after the above tests.    If you have any questions or cardiac concerns, please call our office at [phone].

## 2023-10-09 ENCOUNTER — OFFICE VISIT (OUTPATIENT)
Dept: CARDIOLOGY | Facility: CLINIC | Age: 73
End: 2023-10-09
Payer: MEDICARE

## 2023-10-09 VITALS
BODY MASS INDEX: 34.19 KG/M2 | SYSTOLIC BLOOD PRESSURE: 120 MMHG | WEIGHT: 225.6 LBS | DIASTOLIC BLOOD PRESSURE: 80 MMHG | HEART RATE: 70 BPM | HEIGHT: 68 IN

## 2023-10-09 DIAGNOSIS — I73.9 CLAUDICATION (CMS-HCC): ICD-10-CM

## 2023-10-09 DIAGNOSIS — I42.1 HOCM (HYPERTROPHIC OBSTRUCTIVE CARDIOMYOPATHY) (MULTI): ICD-10-CM

## 2023-10-09 DIAGNOSIS — I25.10 ARTERIOSCLEROTIC CORONARY ARTERY DISEASE: Primary | ICD-10-CM

## 2023-10-09 PROCEDURE — 3008F BODY MASS INDEX DOCD: CPT | Performed by: INTERNAL MEDICINE

## 2023-10-09 PROCEDURE — 99213 OFFICE O/P EST LOW 20 MIN: CPT | Performed by: INTERNAL MEDICINE

## 2023-10-09 PROCEDURE — 1036F TOBACCO NON-USER: CPT | Performed by: INTERNAL MEDICINE

## 2023-10-09 PROCEDURE — 1160F RVW MEDS BY RX/DR IN RCRD: CPT | Performed by: INTERNAL MEDICINE

## 2023-10-09 PROCEDURE — 1159F MED LIST DOCD IN RCRD: CPT | Performed by: INTERNAL MEDICINE

## 2023-10-09 PROCEDURE — 3074F SYST BP LT 130 MM HG: CPT | Performed by: INTERNAL MEDICINE

## 2023-10-09 PROCEDURE — 3079F DIAST BP 80-89 MM HG: CPT | Performed by: INTERNAL MEDICINE

## 2023-10-09 RX ORDER — ROSUVASTATIN CALCIUM 40 MG/1
40 TABLET, COATED ORAL DAILY
COMMUNITY
Start: 2023-09-18 | End: 2024-03-15 | Stop reason: SDUPTHER

## 2023-10-09 RX ORDER — PHENAZOPYRIDINE HYDROCHLORIDE 200 MG/1
200 TABLET, FILM COATED ORAL 3 TIMES DAILY
COMMUNITY
Start: 2023-08-08 | End: 2023-10-09 | Stop reason: ALTCHOICE

## 2023-10-09 ASSESSMENT — ENCOUNTER SYMPTOMS
CLAUDICATION: 1
LOSS OF SENSATION IN FEET: 0
DEPRESSION: 0
OCCASIONAL FEELINGS OF UNSTEADINESS: 0
DYSPNEA ON EXERTION: 1

## 2023-10-09 NOTE — LETTER
October 9, 2023     Raghu Sotelo MD  [address] [facility] [address]    Patient: Moises Graham   YOB: 1950   Date of Visit: 10/9/2023       Dear Dr. Raghu Sotelo MD:    Thank you for referring Moises Graham to me for evaluation. Below are my notes for this consultation.  If you have questions, please do not hesitate to call me. I look forward to following your patient along with you.       Sincerely,     Jayson Tang MD      CC: No Recipients  ______________________________________________________________________________________    Counseling:  The patient was counseled regarding diagnostic results, instructions for management, risk factor reductions, prognosis, patient and family education, impressions, risks and benefits of treatment options and importance of compliance with treatment.      Chief Complaint:   The patient presents today for 3-week followup of exertional SOB, chest pressure and fatigue s/p LHC and echocardiogram.       History Of Present Illness:    Moises Graham is a 73 year old male patient who presents today for 3-week  followup of exertional SOB, chest pressure and fatigue s/p LHC and echocardiogram. His PMH is significant for advanced diabetic maculopathy, prostate CA, CAD with h/o NSTEMI s/p PCI of LAD 07/20/2022, HTN, and hyperlipidemia. The patient was last seen 09/18/2023 by Nancy Cruz NP, at which time the patient reported worsening exertional SOB, exertional chest pressure and fatigue. Based on this report, an echocardiogram and LHC was ordered. On 09/22/2023, echocardiogram demonstrated an EF of 60-65%. basal septal hypertrophy (sigmoid septum), mild MR, and aortic gradient appearing to be predominantly subaortic at the level of LVOT due to sigmoid shaped septum with an elevated flow across LVOT as suggested by elevated LVOT VTI, and LHC revealed mild non-obstructive CAD. Today, the patient states that he has recovered well from LHC; however, he continues to  "report exertional SOB, chest pressure and fatigue. He also reports BLE discomfort and intermittent claudication. He is able to use an Elliptical, but has difficulties using a treadmill s/t LE fatigue and discomfort. BP is stable at home. The patient is compliant with his prescribed medications.      Last Recorded Vitals:  Vitals:    10/09/23 1133   BP: 120/80   BP Location: Right arm   Pulse: 70   Weight: 102 kg (225 lb 9.6 oz)   Height: 1.727 m (5' 8\")     Past Surgical History:  He has a past surgical history that includes CT angio coronary art with heartflow if score >30% (7/8/2022).      Social History:  He reports that he has never smoked. He has never used smokeless tobacco. He reports current alcohol use of about 4.0 standard drinks of alcohol per week. He reports that he does not use drugs.    Family History:  Family History   Family history unknown: Yes      Allergies:  Patient has no known allergies.    Outpatient Medications:  Current Outpatient Medications   Medication Instructions   • abiraterone (Zytiga) 250 mg tablet TAKE FOUR (4) TABLETS BY MOUTH ONCE A DAY IN THE MORNING. NO FOOD OR DRINK 2 HOURS BEFORE OR 1 HOUR AFTER ADMINISTRATION.   • amLODIPine (NORVASC) 1 mg, oral, Daily   • aspirin 81 mg EC tablet 1 tablet, oral, Daily   • atorvastatin (Lipitor) 80 mg tablet 1 tablet, oral, Daily   • b complex vitamins capsule 1 capsule, oral, Daily   • calcium citrate 250 mg calcium tablet 1 tablet, oral, Daily   • cholecalciferol (Vitamin D-3) 25 MCG (1000 UT) tablet 1 tablet, oral, Daily   • coenzyme Q-10 (CO Q-10) 10 mg, oral, Daily   • docusate sodium (Colace) 100 mg capsule 1 capsule, oral, 2 times daily   • ketoconazole (NIZOral) 2 % cream 1 Application, Topical, 2 times daily, Apply a thin layer to affected areas   • meloxicam (Mobic) 15 mg tablet 1 tablet, oral, As needed, Monday, Wednesday, and Friday   • metoprolol succinate XL (Toprol-XL) 50 mg 24 hr tablet 1 tablet, oral, Daily   • " multivitamin-min-iron-FA-vit K (Bariatric Multivitamins) 45 mg iron- 800 mcg-120 mcg capsule 1 capsule, oral   • Myrbetriq 50 mg tablet extended release 24 hr 24 hr tablet 1 tablet, oral, Daily   • nitroglycerin (Nitrostat) 0.4 mg SL tablet 1 tablet, oral, Every 5 min PRN   • pantoprazole (ProtoNix) 40 mg EC tablet 1 tablet, oral, Daily   • polyethylene glycol (GLYCOLAX, MIRALAX) 17 g, oral, Once   • predniSONE (Deltasone) 10 mg tablet TAKE ONE (1) TABLET BY MOUTH ONCE A DAY   • predniSONE (Deltasone) 5 mg tablet 1 tablet, oral, 2 times daily   • rosuvastatin (CRESTOR) 40 mg, oral, Daily, Finish atorvastain before starting   • vitamin-B complex split tablet 1 half tablet, oral, Daily     Review of Systems   Constitutional: Positive for malaise/fatigue.   Cardiovascular:  Positive for chest pain (pressure), claudication (intermittent) and dyspnea on exertion.   All other systems reviewed and are negative.     Physical Exam:  Constitutional:       Appearance: Healthy appearance. Not in distress.   Neck:      Vascular: No JVR. JVD normal.   Pulmonary:      Effort: Pulmonary effort is normal.      Breath sounds: Normal breath sounds. No wheezing. No rhonchi. No rales.   Chest:      Chest wall: Not tender to palpatation.   Cardiovascular:      PMI at left midclavicular line. Normal rate. Regular rhythm. Normal S1. Normal S2.       Murmurs: There is no murmur.      No gallop.  No click. No rub.   Pulses:     Intact distal pulses.   Edema:     Peripheral edema absent.   Abdominal:      General: Bowel sounds are normal.      Palpations: Abdomen is soft.      Tenderness: There is no abdominal tenderness.   Musculoskeletal: Normal range of motion.         General: No tenderness. Skin:     General: Skin is warm and dry.   Neurological:      General: No focal deficit present.      Mental Status: Alert and oriented to person, place and time.        Last Labs:  CBC -  Lab Results   Component Value Date    WBC 5.9 09/18/2023     HGB 12.0 (L) 09/18/2023    HCT 36.9 (L) 09/18/2023    MCV 92 09/18/2023     09/18/2023       CMP -  Lab Results   Component Value Date    CALCIUM 9.1 09/18/2023    PROT 4.9 (L) 08/20/2023    ALBUMIN 2.9 (L) 08/20/2023    AST 32 08/20/2023    ALT 22 08/20/2023    ALKPHOS 93 08/20/2023    BILITOT 1.7 (H) 08/20/2023       LIPID PANEL -   Lab Results   Component Value Date    CHOL 151 04/28/2023    TRIG 123 04/28/2023    HDL 45.4 04/28/2023    CHHDL 3.3 04/28/2023    LDLF 81 04/28/2023    VLDL 25 04/28/2023       RENAL FUNCTION PANEL -   Lab Results   Component Value Date    GLUCOSE 114 (H) 09/18/2023     09/18/2023    K 4.1 09/18/2023     09/18/2023    CO2 29 09/18/2023    ANIONGAP 11 09/18/2023    BUN 16 09/18/2023    CREATININE 0.81 09/18/2023    GFRMALE >90 09/18/2023    CALCIUM 9.1 09/18/2023    ALBUMIN 2.9 (L) 08/20/2023        Lab Results   Component Value Date    BNP 56 06/21/2022       Last Cardiology Tests:  09/22/2023 - Cardiac Catheterization (LH)  1. Mild non-obstructive coronary artery disease.  2. Left Ventricular end-diastolic pressure = 7.  3. Normal LV filling pressures.    09/22/2023 - TTE  1. Left ventricular systolic function is normal with a 60-65% estimated ejection fraction.  2. Spectral Doppler shows an impaired relaxation pattern of left ventricular diastolic filling.  3. Moderately elevated right ventricular systolic pressure.  4. There is basal septal hypertrophy(sigmoid septum).  5. Mild mitral valve regurgitation.  6. Aortic valve area of 2.27 cm2 (1.06 indexed ZARA) by continuity equation. Peak velocity of 2.27 m/s across AV. Peak and mean gradient of 20 mmHg and 12 mmHg respectively across the AV. The gradient appears to be predominantly subaortic at the level of LVOT due to sigmoid shaped septum. There is elevated flow across LVOT as suggested by elevated LVOT VTI. Can consider repeat study with valsalva to evaluate for provoked gradients if clinically indicated. Aortic  valve DI of 0.72 and strove volume index of 40 L/min/m2.    01/25/2023 - TTE  1. Left ventricular systolic function is normal with a 55-60% estimated ejection fraction.  2. Spectral Doppler shows a pseudonormal pattern of left ventricular diastolic filling.  3. There is moderate concentric left ventricular hypertrophy.  4. Mild aortic valve stenosis.    01/05/2023 - Stress Test  1. No clinical evidence for ischemia at maximal workload.  2. Normal perfusion without evidence of ischemia or prior infarct. Calculated ejection fraction is 66% without segmental wall motion abnormality seen.     07/20/2022 - Cardiac Catheterization (LH)  1. Severe 1-vessel CAD involving mid LAD (RFR 0.86).  2. Mid LAD bridge.  3. Successful PCI of hemodynamically significant mid LAD 70% stenosis (RFR 0.86) with 3.5 x 18 mm Resolute [place] [employer] stent post-dilated with 3.5 mm NC balloon.    06/15/2022 - Onco-Cardiology TTE  1. The left ventricular systolic function is normal with a 65-70% estimated ejection fraction.  2. Spectral Doppler shows an impaired relaxation pattern of left ventricular diastolic filling.  3. RVSP within normal limits.  4. Compared with the prior exam from 9/14/2016 the LV function remains unchanged. GLS was not assessed on the prior study.    06/29/2020 - Stress Test  1. Borderline ST response to moderate peak workload max. ETT. Occ. PVC's with exercise. Systolic and diastolic hypertensive response to exercise.   2. Normal stress myocardial perfusion imaging. Well-maintained left ventricular function. 64%     Diagnostic review: I have personally reviewed the result(s) of the Echocardiogram and C.     Assessment/Plan  1) CAD s/p PCI of LAD in 7/2022, HOCM  He had been having recurrent episodes of Hematuria on ASA/Plavix  It was decided previously that since he had a single small stent in a 3.5 mm vessel and his risks of anticoagulation is much higher than benefits  Plavix was previously discontinued   Continue  aspirin 81 mg daily, rosuvastatin 40 mg daily, amlodipine 5 mg daily   Stress 01/25/2023 negative for ischemia  TTE 01/25/2023 with LVEF 55-60, moderate concentric LVH and mild AS  Seen by Nancy Cruz NP, 09/18/2023 - reporting exertional SOB, exertional chest pressure, fatigue - echo and [facility] ordered  TTE 09/22/2023 with LVEF 60-65%, basal septal hypertrophy (sigmoid septum). mild MR; aortic valve area of 2.27 cm2, peak velocity of 2.27 m/s across AV, peak and mean gradient of 20 mmHg and 12 mmHg respectively across the AV, gradient appears to be predominantly subaortic at the level of LVOT due to sigmoid shaped septum. With elevated flow across LVOT as suggested by elevated LVOT VTI.   [facility] 09/22/2023 with mild non-obstructive CAD  Reports persistent exertional SOB, chest pressure and fatigue  Check cardiac MRI    2) Hyperlipidemia  Goal LDL <70  Atorvastatin previously discontinued  On rosuvastatin 40 mg daily   Lipid panel 06/2022 with elevated LDL of 103   Due for repeat lipid panel 12/2023    3) HTN  Stable  On amlodipine 5 mg (increased from 2.5 mg on 09/18/2023), furosemide 20 mg daily, HCTZ 12.5 mg once daily  Improvement in LE edema with the furosemide - requesting alternative s/t urinary frequency  Furosemide previously discontinued s/t urinary frequency   Patient can increase HCTZ up to 25 mg if leg swelling persists/does not improve, but to call our office if he does     4) BLE Discomfort and Intermittent Claudication   Able to use Elliptical  Unable to tolerate treadmill  Check DIANNE      Scribe Attestation  By signing my name below, I, Sharlene Calderon   attest that this documentation has been prepared under the direction and in the presence of Jayson Tang MD.

## 2023-10-10 ENCOUNTER — LAB (OUTPATIENT)
Dept: LAB | Facility: LAB | Age: 73
End: 2023-10-10
Payer: MEDICARE

## 2023-10-10 DIAGNOSIS — C61 MALIGNANT NEOPLASM OF PROSTATE (MULTI): Primary | ICD-10-CM

## 2023-10-10 LAB
BASOPHILS # BLD AUTO: 0.03 X10*3/UL (ref 0–0.1)
BASOPHILS NFR BLD AUTO: 0.4 %
EOSINOPHIL # BLD AUTO: 0.13 X10*3/UL (ref 0–0.4)
EOSINOPHIL NFR BLD AUTO: 1.8 %
ERYTHROCYTE [DISTWIDTH] IN BLOOD BY AUTOMATED COUNT: 12.7 % (ref 11.5–14.5)
HCT VFR BLD AUTO: 37.4 % (ref 41–52)
HGB BLD-MCNC: 12.2 G/DL (ref 13.5–17.5)
IMM GRANULOCYTES # BLD AUTO: 0.02 X10*3/UL (ref 0–0.5)
IMM GRANULOCYTES NFR BLD AUTO: 0.3 % (ref 0–0.9)
LYMPHOCYTES # BLD AUTO: 1.05 X10*3/UL (ref 0.8–3)
LYMPHOCYTES NFR BLD AUTO: 14.7 %
MCH RBC QN AUTO: 30.5 PG (ref 26–34)
MCHC RBC AUTO-ENTMCNC: 32.6 G/DL (ref 32–36)
MCV RBC AUTO: 94 FL (ref 80–100)
MONOCYTES # BLD AUTO: 0.39 X10*3/UL (ref 0.05–0.8)
MONOCYTES NFR BLD AUTO: 5.5 %
NEUTROPHILS # BLD AUTO: 5.5 X10*3/UL (ref 1.6–5.5)
NEUTROPHILS NFR BLD AUTO: 77.3 %
NRBC BLD-RTO: 0 /100 WBCS (ref 0–0)
PLATELET # BLD AUTO: 205 X10*3/UL (ref 150–450)
PMV BLD AUTO: 10.7 FL (ref 7.5–11.5)
RBC # BLD AUTO: 4 X10*6/UL (ref 4.5–5.9)
WBC # BLD AUTO: 7.1 X10*3/UL (ref 4.4–11.3)

## 2023-10-10 PROCEDURE — 85025 COMPLETE CBC W/AUTO DIFF WBC: CPT

## 2023-10-10 PROCEDURE — 84403 ASSAY OF TOTAL TESTOSTERONE: CPT

## 2023-10-10 PROCEDURE — 80053 COMPREHEN METABOLIC PANEL: CPT

## 2023-10-10 PROCEDURE — 36415 COLL VENOUS BLD VENIPUNCTURE: CPT

## 2023-10-10 PROCEDURE — 84153 ASSAY OF PSA TOTAL: CPT

## 2023-10-11 ENCOUNTER — SPECIALTY PHARMACY (OUTPATIENT)
Dept: PHARMACY | Facility: CLINIC | Age: 73
End: 2023-10-11

## 2023-10-11 LAB
ALBUMIN SERPL BCP-MCNC: 4.2 G/DL (ref 3.4–5)
ALP SERPL-CCNC: 94 U/L (ref 33–136)
ALT SERPL W P-5'-P-CCNC: 20 U/L (ref 10–52)
ANION GAP SERPL CALC-SCNC: 15 MMOL/L (ref 10–20)
AST SERPL W P-5'-P-CCNC: 26 U/L (ref 9–39)
BILIRUB SERPL-MCNC: 1.3 MG/DL (ref 0–1.2)
BUN SERPL-MCNC: 16 MG/DL (ref 6–23)
CALCIUM SERPL-MCNC: 9.1 MG/DL (ref 8.6–10.6)
CHLORIDE SERPL-SCNC: 106 MMOL/L (ref 98–107)
CO2 SERPL-SCNC: 27 MMOL/L (ref 21–32)
CREAT SERPL-MCNC: 0.84 MG/DL (ref 0.5–1.3)
GFR SERPL CREATININE-BSD FRML MDRD: >90 ML/MIN/1.73M*2
GLUCOSE SERPL-MCNC: 125 MG/DL (ref 74–99)
POTASSIUM SERPL-SCNC: 4 MMOL/L (ref 3.5–5.3)
PROT SERPL-MCNC: 6.2 G/DL (ref 6.4–8.2)
PSA SERPL-MCNC: <0.1 NG/ML
SODIUM SERPL-SCNC: 144 MMOL/L (ref 136–145)
TESTOST SERPL-MCNC: <30 NG/DL (ref 240–1000)

## 2023-10-12 ENCOUNTER — PHARMACY VISIT (OUTPATIENT)
Dept: PHARMACY | Facility: CLINIC | Age: 73
End: 2023-10-12
Payer: COMMERCIAL

## 2023-10-12 PROCEDURE — RXMED WILLOW AMBULATORY MEDICATION CHARGE

## 2023-10-17 RX ORDER — EPINEPHRINE 0.3 MG/.3ML
0.3 INJECTION SUBCUTANEOUS EVERY 5 MIN PRN
Status: CANCELLED | OUTPATIENT
Start: 2023-10-18

## 2023-10-17 RX ORDER — FAMOTIDINE 10 MG/ML
20 INJECTION INTRAVENOUS ONCE AS NEEDED
Status: CANCELLED | OUTPATIENT
Start: 2023-10-18

## 2023-10-17 RX ORDER — DIPHENHYDRAMINE HYDROCHLORIDE 50 MG/ML
50 INJECTION INTRAMUSCULAR; INTRAVENOUS AS NEEDED
Status: CANCELLED | OUTPATIENT
Start: 2023-10-18

## 2023-10-17 RX ORDER — ALBUTEROL SULFATE 0.83 MG/ML
3 SOLUTION RESPIRATORY (INHALATION) AS NEEDED
Status: CANCELLED | OUTPATIENT
Start: 2023-10-18

## 2023-10-17 NOTE — PROGRESS NOTES
Patient ID: Moises Graham is a 73 y.o. male.  Attending Physician: Dr. Otoniel Wilson  Cancer Diagnosis: Cancer Staging   No matching staging information was found for the patient.   Current Therapy:   ADT (Eligard 45 mg subcutaneous q24 weeks)  Abiraterone Acetate 1000 mg PO daily  Prednisone 5 mg PO daily    Subjective      Cancer History:  Oncology History    No history exists.     Prostate Cancer - [facility]  2012: RP with LND for intermediate-risk prostate cancer (iPSA/pT2c/GS7/negative margins and nodes). PSA was undetectable after surgery  2016: BCR and underwent salvage RT to prostate region, pre-RT PSA 2.39. No PSA response, serologic PD. He was started on ADT with Relugolix as part of a clinical trial, last 2018  7/2019: PSA purvi 0.6. Axumin PET without metastatic disease.  Late 2019: serologic PD and started again on ADT with Lupron. Initial PSA decline, however subsequent PSA rise, on observation. PSADT since 4-6 mos.   Summer 2021:   10/2021: PSMA PET revealed multiple lymph nodes (Pelvic/PRLNs and mediastinal LNs) without bone disease.   11/2021: Started ADT and AA/P with PSA response.  1/17/2023: PSA undetectable  4/28/2023: PSA undetectable  2023: negative genetic testing  7/2023: PSA undetectable    Other Providers:  Dr. Raghu Sotelo, PCP  Dr. Jayson Tang, cardiology  Dr. Victor Manuel Tang, urology    Interval History:  Mr. Graham presents today in follow up. He overall is improving from kidney stone hospitalization, but is having a workup with cardiology for SOB and fatigue. He will have cardiac MRI and DIANNE soon. He gets tired very easily, which has been gradually worsening over the past year. He does have some bilateral LE edema. Off diuretics at this time. UI has resolved with AUS. No other new or concerning symptoms at this time. The remainder of his ROS is otherwise negative.    HPI    Objective    BSA: There is no height or weight on file to calculate BSA.  There were no vitals taken for this  visit.    Physical Exam  PHYSICAL EXAM:   General: alert, well-dressed in NAD. Speech is fluent and coherent, words clear. Good insight. Oriented x4  Skin: warm, dry, and pink without cyanosis or nail clubbing. No rash, petechiae, or ecchymoses  HEENT: Normocephalic atraumatic. Sclera white, conjunctiva pink. EOMs intact. Hearing intact to spoken voice. No visible lesions  Respiratory: Chest expansion symmetric. No audible wheeze. Unlabored breathing.  CV: Good color No edema bilaterally.  Psych: engaged, polite, appropriate conversation and eye contact.    Current Medications:    Current Outpatient Medications:     abiraterone (Zytiga) 250 mg tablet, TAKE FOUR (4) TABLETS BY MOUTH ONCE A DAY IN THE MORNING. NO FOOD OR DRINK 2 HOURS BEFORE OR 1 HOUR AFTER ADMINISTRATION., Disp: 120 tablet, Rfl: 0    amLODIPine (Norvasc) 5 mg tablet, Take 1 mg by mouth once daily., Disp: , Rfl:     aspirin 81 mg EC tablet, Take 1 tablet (81 mg) by mouth once daily., Disp: , Rfl:     atorvastatin (Lipitor) 80 mg tablet, Take 1 tablet (80 mg) by mouth once daily., Disp: , Rfl:     b complex vitamins capsule, Take 1 capsule by mouth once daily., Disp: , Rfl:     calcium citrate 250 mg calcium tablet, Take 1 tablet (250 mg) by mouth once daily., Disp: , Rfl:     cholecalciferol (Vitamin D-3) 25 MCG (1000 UT) tablet, Take 1 tablet (25 mcg) by mouth once daily., Disp: , Rfl:     coenzyme Q-10 (Co Q-10) 10 mg capsule, Take 1 capsule (10 mg) by mouth once daily., Disp: , Rfl:     docusate sodium (Colace) 100 mg capsule, Take 1 capsule (100 mg) by mouth twice a day., Disp: , Rfl:     ketoconazole (NIZOral) 2 % cream, Apply 1 Application topically 2 times a day. Apply a thin layer to affected areas, Disp: , Rfl:     meloxicam (Mobic) 15 mg tablet, Take 1 tablet (15 mg) by mouth if needed. Monday, Wednesday, and Friday, Disp: , Rfl:     metoprolol succinate XL (Toprol-XL) 50 mg 24 hr tablet, Take 1 tablet (50 mg) by mouth once daily., Disp: ,  Rfl:     multivitamin-min-iron-FA-vit K (Bariatric Multivitamins) 45 mg iron- 800 mcg-120 mcg capsule, Take 1 capsule by mouth., Disp: , Rfl:     Myrbetriq 50 mg tablet extended release 24 hr 24 hr tablet, Take 1 tablet (50 mg) by mouth once daily., Disp: , Rfl:     nitroglycerin (Nitrostat) 0.4 mg SL tablet, Take 1 tablet (0.4 mg) by mouth every 5 minutes if needed., Disp: , Rfl:     pantoprazole (ProtoNix) 40 mg EC tablet, Take 1 tablet (40 mg) by mouth once daily., Disp: , Rfl:     polyethylene glycol (Glycolax, Miralax) 17 gram/dose powder, Take 17 g by mouth 1 time., Disp: , Rfl:     predniSONE (Deltasone) 10 mg tablet, TAKE ONE (1) TABLET BY MOUTH ONCE A DAY (Patient taking differently: Take 0.5 tablets (5 mg) by mouth once daily.), Disp: 30 tablet, Rfl: 0    predniSONE (Deltasone) 5 mg tablet, Take 1 tablet (5 mg) by mouth twice a day., Disp: , Rfl:     rosuvastatin (Crestor) 40 mg tablet, Take 1 tablet (40 mg) by mouth once daily. Finish atorvastain before starting, Disp: , Rfl:     vitamin-B complex split tablet, Take 1 half tablet by mouth once daily., Disp: , Rfl:      Most Recent Labs:  Results for orders placed or performed in visit on 10/10/23   Comprehensive Metabolic Panel   Result Value Ref Range    Glucose 125 (H) 74 - 99 mg/dL    Sodium 144 136 - 145 mmol/L    Potassium 4.0 3.5 - 5.3 mmol/L    Chloride 106 98 - 107 mmol/L    Bicarbonate 27 21 - 32 mmol/L    Anion Gap 15 10 - 20 mmol/L    Urea Nitrogen 16 6 - 23 mg/dL    Creatinine 0.84 0.50 - 1.30 mg/dL    eGFR >90 >60 mL/min/1.73m*2    Calcium 9.1 8.6 - 10.6 mg/dL    Albumin 4.2 3.4 - 5.0 g/dL    Alkaline Phosphatase 94 33 - 136 U/L    Total Protein 6.2 (L) 6.4 - 8.2 g/dL    AST 26 9 - 39 U/L    Bilirubin, Total 1.3 (H) 0.0 - 1.2 mg/dL    ALT 20 10 - 52 U/L   CBC and Auto Differential   Result Value Ref Range    WBC 7.1 4.4 - 11.3 x10*3/uL    nRBC 0.0 0.0 - 0.0 /100 WBCs    RBC 4.00 (L) 4.50 - 5.90 x10*6/uL    Hemoglobin 12.2 (L) 13.5 - 17.5 g/dL     Hematocrit 37.4 (L) 41.0 - 52.0 %    MCV 94 80 - 100 fL    MCH 30.5 26.0 - 34.0 pg    MCHC 32.6 32.0 - 36.0 g/dL    RDW 12.7 11.5 - 14.5 %    Platelets 205 150 - 450 x10*3/uL    MPV 10.7 7.5 - 11.5 fL    Neutrophils % 77.3 40.0 - 80.0 %    Immature Granulocytes %, Automated 0.3 0.0 - 0.9 %    Lymphocytes % 14.7 13.0 - 44.0 %    Monocytes % 5.5 2.0 - 10.0 %    Eosinophils % 1.8 0.0 - 6.0 %    Basophils % 0.4 0.0 - 2.0 %    Neutrophils Absolute 5.50 1.60 - 5.50 x10*3/uL    Immature Granulocytes Absolute, Automated 0.02 0.00 - 0.50 x10*3/uL    Lymphocytes Absolute 1.05 0.80 - 3.00 x10*3/uL    Monocytes Absolute 0.39 0.05 - 0.80 x10*3/uL    Eosinophils Absolute 0.13 0.00 - 0.40 x10*3/uL    Basophils Absolute 0.03 0.00 - 0.10 x10*3/uL   Prostate Specific Antigen, Screen   Result Value Ref Range    Prostate Specific Antigen,Screen <0.10 <=4.00 ng/mL   Testosterone   Result Value Ref Range    Testosterone <30 (L) 240 - 1,000 ng/dL      Lab Results   Component Value Date    PSA <0.10 07/24/2023    PSA <0.10 04/28/2023    PSA <0.10 01/17/2023        Performance Status:  Symptomatic; fully ambulatory    Assessment/Plan   Moises Graham is a 73 y.o. male with metastatic prostate cancer to multiple LN's,  who presents in follow up ADT and AA/P. He looks and feels overall relatively well. Labs relatively unremarkable. PSA remains undetectable.    He does have fatigue and PARK. This is being worked up from cardiology team. However, could have a component from abiraterone. Given flux with cardiology meds and testing, he'd like to keep same at this time, but will notify us for consideration of holding emeli to evaluate for fatigue. We discussed dose reduction after hold. He will reach out if he'd like to do this. He had tried increase in prednisone without much notable help.    # Prostate cancer  - Continue ADT, due today, again due April 2024.  - Continue AA/P, consider hold/reduction    # PARK  # Fatigue  - Continue workup with  cardiology team  - Consider hold of emeli    # Bone Health  - Continue calcium and vitamin D supplementation  - Continue regular, weight-bearing physical activity  - Last DEXA 7/2022 normal, Consider DEXA 7/2024    # Health Maintenance  - Continue with PCP and other healthcare providers  - Continue exercise, heart-healthy diet    RTC 3 months for OV with Dr. Wilson  Scheduled 6 month follow up with me and injection today    Total time spent on this encounter was 60 minutes, which included preparation, direct time with patient, documentation, and care coordination on the day of visit.    Angelita Bean, MSN, APRN, AGNP-C, AOCNP  Associate Nurse Practitioner  [facility], [facility]

## 2023-10-18 ENCOUNTER — INFUSION (OUTPATIENT)
Dept: HEMATOLOGY/ONCOLOGY | Facility: HOSPITAL | Age: 73
End: 2023-10-18
Payer: MEDICARE

## 2023-10-18 ENCOUNTER — OFFICE VISIT (OUTPATIENT)
Dept: HEMATOLOGY/ONCOLOGY | Facility: HOSPITAL | Age: 73
End: 2023-10-18
Payer: MEDICARE

## 2023-10-18 VITALS
WEIGHT: 222.44 LBS | BODY MASS INDEX: 33.71 KG/M2 | HEART RATE: 67 BPM | RESPIRATION RATE: 16 BRPM | SYSTOLIC BLOOD PRESSURE: 139 MMHG | DIASTOLIC BLOOD PRESSURE: 71 MMHG | HEIGHT: 68 IN | TEMPERATURE: 96.4 F | OXYGEN SATURATION: 99 %

## 2023-10-18 DIAGNOSIS — C61 MALIGNANT NEOPLASM OF PROSTATE (MULTI): ICD-10-CM

## 2023-10-18 DIAGNOSIS — C61 MALIGNANT NEOPLASM OF PROSTATE (MULTI): Primary | ICD-10-CM

## 2023-10-18 PROCEDURE — 3008F BODY MASS INDEX DOCD: CPT | Performed by: NURSE PRACTITIONER

## 2023-10-18 PROCEDURE — 1126F AMNT PAIN NOTED NONE PRSNT: CPT | Performed by: NURSE PRACTITIONER

## 2023-10-18 PROCEDURE — 1036F TOBACCO NON-USER: CPT | Performed by: NURSE PRACTITIONER

## 2023-10-18 PROCEDURE — 1160F RVW MEDS BY RX/DR IN RCRD: CPT | Performed by: NURSE PRACTITIONER

## 2023-10-18 PROCEDURE — 2500000004 HC RX 250 GENERAL PHARMACY W/ HCPCS (ALT 636 FOR OP/ED): Mod: JZ | Performed by: NURSE PRACTITIONER

## 2023-10-18 PROCEDURE — 96402 CHEMO HORMON ANTINEOPL SQ/IM: CPT

## 2023-10-18 PROCEDURE — 1159F MED LIST DOCD IN RCRD: CPT | Performed by: NURSE PRACTITIONER

## 2023-10-18 PROCEDURE — 3078F DIAST BP <80 MM HG: CPT | Performed by: NURSE PRACTITIONER

## 2023-10-18 PROCEDURE — 99215 OFFICE O/P EST HI 40 MIN: CPT | Performed by: NURSE PRACTITIONER

## 2023-10-18 PROCEDURE — 3075F SYST BP GE 130 - 139MM HG: CPT | Performed by: NURSE PRACTITIONER

## 2023-10-18 RX ORDER — DIPHENHYDRAMINE HYDROCHLORIDE 50 MG/ML
50 INJECTION INTRAMUSCULAR; INTRAVENOUS AS NEEDED
Status: CANCELLED | OUTPATIENT
Start: 2024-01-07

## 2023-10-18 RX ORDER — FAMOTIDINE 10 MG/ML
20 INJECTION INTRAVENOUS ONCE AS NEEDED
Status: DISCONTINUED | OUTPATIENT
Start: 2023-10-18 | End: 2023-10-18 | Stop reason: HOSPADM

## 2023-10-18 RX ORDER — CHOLECALCIFEROL (VITAMIN D3) 25 MCG
2000 TABLET ORAL DAILY
COMMUNITY

## 2023-10-18 RX ORDER — EPINEPHRINE 0.3 MG/.3ML
0.3 INJECTION SUBCUTANEOUS EVERY 5 MIN PRN
Status: CANCELLED | OUTPATIENT
Start: 2024-01-07

## 2023-10-18 RX ORDER — FAMOTIDINE 10 MG/ML
20 INJECTION INTRAVENOUS ONCE AS NEEDED
Status: CANCELLED | OUTPATIENT
Start: 2024-01-07

## 2023-10-18 RX ORDER — ALBUTEROL SULFATE 0.83 MG/ML
3 SOLUTION RESPIRATORY (INHALATION) AS NEEDED
Status: CANCELLED | OUTPATIENT
Start: 2024-01-07

## 2023-10-18 RX ORDER — ALBUTEROL SULFATE 0.83 MG/ML
3 SOLUTION RESPIRATORY (INHALATION) AS NEEDED
Status: DISCONTINUED | OUTPATIENT
Start: 2023-10-18 | End: 2023-10-18 | Stop reason: HOSPADM

## 2023-10-18 RX ORDER — DIPHENHYDRAMINE HYDROCHLORIDE 50 MG/ML
50 INJECTION INTRAMUSCULAR; INTRAVENOUS AS NEEDED
Status: DISCONTINUED | OUTPATIENT
Start: 2023-10-18 | End: 2023-10-18 | Stop reason: HOSPADM

## 2023-10-18 RX ORDER — EPINEPHRINE 0.3 MG/.3ML
0.3 INJECTION SUBCUTANEOUS EVERY 5 MIN PRN
Status: DISCONTINUED | OUTPATIENT
Start: 2023-10-18 | End: 2023-10-18 | Stop reason: HOSPADM

## 2023-10-18 RX ADMIN — LEUPROLIDE ACETATE 45 MG: KIT SUBCUTANEOUS at 12:15

## 2023-10-18 ASSESSMENT — PAIN SCALES - GENERAL: PAINLEVEL: 0-NO PAIN

## 2023-11-02 ENCOUNTER — HOSPITAL ENCOUNTER (OUTPATIENT)
Dept: RADIOLOGY | Facility: HOSPITAL | Age: 73
Discharge: HOME | End: 2023-11-02
Payer: MEDICARE

## 2023-11-02 ENCOUNTER — HOSPITAL ENCOUNTER (OUTPATIENT)
Dept: VASCULAR MEDICINE | Facility: HOSPITAL | Age: 73
Discharge: HOME | End: 2023-11-02
Payer: MEDICARE

## 2023-11-02 DIAGNOSIS — I25.10 ARTERIOSCLEROTIC CORONARY ARTERY DISEASE: ICD-10-CM

## 2023-11-02 DIAGNOSIS — I73.9 CLAUDICATION (CMS-HCC): ICD-10-CM

## 2023-11-02 DIAGNOSIS — I42.1 HOCM (HYPERTROPHIC OBSTRUCTIVE CARDIOMYOPATHY) (MULTI): ICD-10-CM

## 2023-11-02 PROCEDURE — 93924 LWR XTR VASC STDY BILAT: CPT

## 2023-11-02 PROCEDURE — 93924 LWR XTR VASC STDY BILAT: CPT | Performed by: INTERNAL MEDICINE

## 2023-11-02 PROCEDURE — 75561 CARDIAC MRI FOR MORPH W/DYE: CPT | Performed by: STUDENT IN AN ORGANIZED HEALTH CARE EDUCATION/TRAINING PROGRAM

## 2023-11-02 PROCEDURE — A9575 INJ GADOTERATE MEGLUMI 0.1ML: HCPCS | Performed by: INTERNAL MEDICINE

## 2023-11-02 PROCEDURE — 2550000001 HC RX 255 CONTRASTS: Performed by: INTERNAL MEDICINE

## 2023-11-02 PROCEDURE — 75561 CARDIAC MRI FOR MORPH W/DYE: CPT

## 2023-11-02 PROCEDURE — 75565 CARD MRI VELOC FLOW MAPPING: CPT | Performed by: STUDENT IN AN ORGANIZED HEALTH CARE EDUCATION/TRAINING PROGRAM

## 2023-11-02 RX ORDER — GADOTERATE MEGLUMINE 376.9 MG/ML
30 INJECTION INTRAVENOUS
Status: COMPLETED | OUTPATIENT
Start: 2023-11-02 | End: 2023-11-02

## 2023-11-02 RX ADMIN — GADOTERATE MEGLUMINE 30 ML: 376.9 INJECTION INTRAVENOUS at 09:32

## 2023-11-03 ENCOUNTER — PHARMACY VISIT (OUTPATIENT)
Dept: PHARMACY | Facility: CLINIC | Age: 73
End: 2023-11-03
Payer: COMMERCIAL

## 2023-11-03 DIAGNOSIS — C61 MALIGNANT NEOPLASM OF PROSTATE (MULTI): Primary | ICD-10-CM

## 2023-11-03 RX ORDER — ABIRATERONE ACETATE 250 MG/1
TABLET ORAL
Qty: 120 TABLET | Refills: 0 | Status: CANCELLED | OUTPATIENT
Start: 2023-11-03 | End: 2024-11-02

## 2023-11-09 ENCOUNTER — TELEPHONE (OUTPATIENT)
Dept: HEMATOLOGY/ONCOLOGY | Facility: HOSPITAL | Age: 73
End: 2023-11-09
Payer: MEDICARE

## 2023-11-09 DIAGNOSIS — C61 MALIGNANT NEOPLASM OF PROSTATE (MULTI): Primary | ICD-10-CM

## 2023-11-09 RX ORDER — ABIRATERONE ACETATE 250 MG/1
TABLET ORAL
Qty: 120 TABLET | Refills: 11 | Status: SHIPPED | OUTPATIENT
Start: 2023-11-09 | End: 2024-11-08

## 2023-11-09 RX ORDER — PREDNISONE 5 MG/1
5 TABLET ORAL DAILY
Qty: 30 TABLET | Refills: 11 | Status: SHIPPED | OUTPATIENT
Start: 2023-11-09 | End: 2024-11-03

## 2023-11-09 RX ORDER — ABIRATERONE ACETATE 250 MG/1
1000 TABLET ORAL DAILY
Qty: 120 TABLET | Refills: 2 | OUTPATIENT
Start: 2023-11-09

## 2023-11-09 RX ORDER — PREDNISONE 5 MG/1
5 TABLET ORAL DAILY
Qty: 30 TABLET | Refills: 1 | OUTPATIENT
Start: 2023-11-09

## 2023-11-10 ENCOUNTER — PHARMACY VISIT (OUTPATIENT)
Dept: PHARMACY | Facility: CLINIC | Age: 73
End: 2023-11-10
Payer: COMMERCIAL

## 2023-11-10 PROCEDURE — RXMED WILLOW AMBULATORY MEDICATION CHARGE

## 2023-11-13 ENCOUNTER — PHARMACY VISIT (OUTPATIENT)
Dept: PHARMACY | Facility: CLINIC | Age: 73
End: 2023-11-13
Payer: COMMERCIAL

## 2023-11-13 ENCOUNTER — SPECIALTY PHARMACY (OUTPATIENT)
Dept: PHARMACY | Facility: CLINIC | Age: 73
End: 2023-11-13

## 2023-11-13 PROCEDURE — RXMED WILLOW AMBULATORY MEDICATION CHARGE

## 2023-11-15 RX ORDER — AMLODIPINE BESYLATE 2.5 MG/1
5 TABLET ORAL DAILY
COMMUNITY
Start: 2023-11-09 | End: 2024-03-18 | Stop reason: SDUPTHER

## 2023-11-15 RX ORDER — MULTIVITAMIN
TABLET ORAL
COMMUNITY

## 2023-11-15 ASSESSMENT — ENCOUNTER SYMPTOMS: DYSPNEA ON EXERTION: 1

## 2023-11-16 NOTE — PROGRESS NOTES
"Counseling:  The patient was counseled regarding diagnostic results, instructions for management, risk factor reductions, prognosis, patient and family education, impressions, risks and benefits of treatment options and importance of compliance with treatment.      Chief Complaint:   The patient presents today for 1-month followup of persistent exertional SOB, chest pressure and fatigue s/p cardiac MRI, and BLE discomfort and intermittent claudication s/p DIANNE.     History Of Present Illness:    Moises Graham is a 73 year old male patient who presents today in the company of his wife for 1-month followup of persistent exertional SOB, chest pressure and fatigue s/p cardiac MRI, and BLE discomfort and intermittent claudication s/p DIANNE. His PMH is significant for advanced diabetic maculopathy, prostate CA, CAD with h/o NSTEMI s/p PCI of LAD 07/20/2022, HTN, and hyperlipidemia. On 11/02/2023, DIANNE was negative for arterial occlusive disease bilaterally, and cardiac MRI revealed mild thickening at the level of the basal septum (maximal wall thickness 1.2 cm) and peak provoked LV outflow tract gradient of 27 mm Hg with differential diagnosis including ventricular septal bulge for longstanding hypertension or less likely a subtle form of hypertrophic obstructive cardiomyopathy. Today, the patient reports persistent exertional SOB, BLE discomfort with pain in his buttocks and LE fatigue with ambulation, and fatigue. CTA of the chest performed in 08/2023 revealed findings suggestive of pulmonary fibrosis. The patient denies ever being seen by a pulmonologist.       Last Recorded Vitals:  Vitals:    11/17/23 1143   BP: 150/74   BP Location: Left arm   Pulse: 77   Weight: 102 kg (225 lb)   Height: 1.727 m (5' 8\")     Past Surgical History:  He has a past surgical history that includes CT angio coronary art with heartflow if score >30% (7/8/2022).      Social History:  He reports that he has never smoked. He has never used " Yes smokeless tobacco. He reports current alcohol use of about 4.0 standard drinks of alcohol per week. He reports that he does not use drugs.    Family History:  Family History   Family history unknown: Yes      Allergies:  Patient has no known allergies.    Outpatient Medications:  Current Outpatient Medications   Medication Instructions    abiraterone (Zytiga) 250 mg tablet TAKE FOUR (4) TABLETS BY MOUTH ONCE A DAY IN THE MORNING. NO FOOD OR DRINK 2 HOURS BEFORE OR 1 HOUR AFTER ADMINISTRATION.    amLODIPine (NORVASC) 1 mg, oral, Daily    amLODIPine (NORVASC) 2.5 mg, oral    aspirin 81 mg EC tablet 1 tablet, oral, Daily    atorvastatin (Lipitor) 80 mg tablet 1 tablet, oral, Daily    calcium citrate-vitamin D2 250 mg-2.5 mcg (100 unit) tablet 2 tablets, oral, 2 times daily    cholecalciferol (VITAMIN D3) 1,000 Units, oral, Daily    coenzyme Q-10 (CO Q-10) 10 mg, oral, Daily    docusate sodium (Colace) 100 mg capsule 1 capsule, oral, 2 times daily    ketoconazole (NIZOral) 2 % cream 1 Application, Topical, 2 times daily PRN, Apply a thin layer to affected areas    leuprolide (6-month) (LUPRON DEPOT) 45 mg, intramuscular    meloxicam (Mobic) 15 mg tablet 1 tablet, oral, As needed, Monday, Wednesday, and Friday    metoprolol succinate XL (Toprol-XL) 50 mg 24 hr tablet 1 tablet, oral, Daily    multivitamin tablet oral    multivitamin-min-iron-FA-vit K (Bariatric Multivitamins) 45 mg iron- 800 mcg-120 mcg capsule 1 capsule, oral    Myrbetriq 50 mg tablet extended release 24 hr 24 hr tablet 1 tablet, oral, Daily    nitroglycerin (Nitrostat) 0.4 mg SL tablet 1 tablet, oral, Every 5 min PRN    pantoprazole (ProtoNix) 40 mg EC tablet 1 tablet, oral, Daily    polyethylene glycol (GLYCOLAX, MIRALAX) 17 g, oral, As needed    predniSONE (DELTASONE) 5 mg, oral, Daily    rosuvastatin (CRESTOR) 40 mg, oral, Daily, Finish atorvastain before starting    vitamin-B complex split tablet 1 half tablet, oral, Daily     Review of Systems    Constitutional: Positive for malaise/fatigue.   Cardiovascular:  Positive for dyspnea on exertion.   Neurological:         BLE discomfort, with pain in buttocks   All other systems reviewed and are negative.     Physical Exam:  Constitutional:       Appearance: Healthy appearance. Not in distress.   Neck:      Vascular: No JVR. JVD normal.   Pulmonary:      Effort: Pulmonary effort is normal.      Breath sounds: Normal breath sounds. No wheezing. No rhonchi. No rales.   Chest:      Chest wall: Not tender to palpatation.   Cardiovascular:      PMI at left midclavicular line. Normal rate. Regular rhythm. Normal S1. Normal S2.       Murmurs: There is no murmur.      No gallop.  No click. No rub.   Pulses:     Intact distal pulses.   Edema:     Peripheral edema absent.   Abdominal:      General: Bowel sounds are normal.      Palpations: Abdomen is soft.      Tenderness: There is no abdominal tenderness.   Musculoskeletal: Normal range of motion.         General: No tenderness. Skin:     General: Skin is warm and dry.   Neurological:      General: No focal deficit present.      Mental Status: Alert and oriented to person, place and time.        Last Labs:  CBC -  Lab Results   Component Value Date    WBC 7.1 10/10/2023    HGB 12.2 (L) 10/10/2023    HCT 37.4 (L) 10/10/2023    MCV 94 10/10/2023     10/10/2023       CMP -  Lab Results   Component Value Date    CALCIUM 9.1 10/10/2023    PROT 6.2 (L) 10/10/2023    ALBUMIN 4.2 10/10/2023    AST 26 10/10/2023    ALT 20 10/10/2023    ALKPHOS 94 10/10/2023    BILITOT 1.3 (H) 10/10/2023       LIPID PANEL -   Lab Results   Component Value Date    CHOL 151 04/28/2023    TRIG 123 04/28/2023    HDL 45.4 04/28/2023    CHHDL 3.3 04/28/2023    LDLF 81 04/28/2023    VLDL 25 04/28/2023       RENAL FUNCTION PANEL -   Lab Results   Component Value Date    GLUCOSE 125 (H) 10/10/2023     10/10/2023    K 4.0 10/10/2023     10/10/2023    CO2 27 10/10/2023    ANIONGAP 15  10/10/2023    BUN 16 10/10/2023    CREATININE 0.84 10/10/2023    GFRMALE >90 09/18/2023    CALCIUM 9.1 10/10/2023    ALBUMIN 4.2 10/10/2023        Lab Results   Component Value Date    BNP 56 06/21/2022       Last Cardiology Tests:  11/02/2023 - Vascular Lab PVR DIANNE  1. Right Lower PVR: No evidence of arterial occlusive disease in the right lower extremity at rest. Normal digital perfusion noted. Triphasic flow is noted in the right common femoral artery, right posterior tibial artery and right dorsalis pedis artery. Results called by waveforms/tracings due to partially non-compressible vessels.  2. Left Lower PVR: No evidence of arterial occlusive disease in the left lower extremity at rest. Normal digital perfusion noted. Triphasic flow is noted in the left common femoral artery, left posterior tibial artery and left dorsalis pedis artery.     11/02/2023 - Cardiac MRI  1. There is mild thickening at the level of the basal septum. Maximal wall thickness 1.2 cm. No Bj or subaortic membrane. Normal LV mass. Peak provoked LV outflow tract gradient 27 mm Hg. Differential diagnosis include ventricular septal bulge for longstanding hypertension or less likely a subtle form of hypertrophic obstructive cardiomyopathy.  2. There are no findings to suggest prior ischemic damage or an infiltrative process.    09/22/2023 - Cardiac Catheterization (LH)  1. Mild non-obstructive coronary artery disease.  2. Left Ventricular end-diastolic pressure = 7.  3. Normal LV filling pressures.    09/22/2023 - TTE  1. Left ventricular systolic function is normal with a 60-65% estimated ejection fraction.  2. Spectral Doppler shows an impaired relaxation pattern of left ventricular diastolic filling.  3. Moderately elevated right ventricular systolic pressure.  4. There is basal septal hypertrophy(sigmoid septum).  5. Mild mitral valve regurgitation.  6. Aortic valve area of 2.27 cm2 (1.06 indexed ZARA) by continuity equation. Peak velocity  of 2.27 m/s across AV. Peak and mean gradient of 20 mmHg and 12 mmHg respectively across the AV. The gradient appears to be predominantly subaortic at the level of LVOT due to sigmoid shaped septum. There is elevated flow across LVOT as suggested by elevated LVOT VTI. Can consider repeat study with valsalva to evaluate for provoked gradients if clinically indicated. Aortic valve DI of 0.72 and strove volume index of 40 L/min/m2.    01/25/2023 - TTE  1. Left ventricular systolic function is normal with a 55-60% estimated ejection fraction.  2. Spectral Doppler shows a pseudonormal pattern of left ventricular diastolic filling.  3. There is moderate concentric left ventricular hypertrophy.  4. Mild aortic valve stenosis.    01/05/2023 - Stress Test  1. No clinical evidence for ischemia at maximal workload.  2. Normal perfusion without evidence of ischemia or prior infarct. Calculated ejection fraction is 66% without segmental wall motion abnormality seen.     07/20/2022 - Cardiac Catheterization (LH)  1. Severe 1-vessel CAD involving mid LAD (RFR 0.86).  2. Mid LAD bridge.  3. Successful PCI of hemodynamically significant mid LAD 70% stenosis (RFR 0.86) with 3.5 x 18 mm Resolute [place] stent post-dilated with 3.5 mm NC balloon.    06/15/2022 - Onco-Cardiology TTE  1. The left ventricular systolic function is normal with a 65-70% estimated ejection fraction.  2. Spectral Doppler shows an impaired relaxation pattern of left ventricular diastolic filling.  3. RVSP within normal limits.  4. Compared with the prior exam from 9/14/2016 the LV function remains unchanged. GLS was not assessed on the prior study.    06/29/2020 - Stress Test  1. Borderline ST response to moderate peak workload max. ETT. Occ. PVC's with exercise. Systolic and diastolic hypertensive response to exercise.   2. Normal stress myocardial perfusion imaging. Well-maintained left ventricular function. 64%     Diagnostic review: I have personally  reviewed the result(s) of the DIANNE and Cardiac MRI.     Assessment/Plan   1) CAD s/p PCI of LAD in 7/2022, HOCM  He had been having recurrent episodes of Hematuria on ASA/Plavix  It was decided previously that since he had a single small stent in a 3.5 mm vessel and his risks of anticoagulation is much higher than benefits  Plavix was previously discontinued   Continue aspirin 81 mg daily, rosuvastatin 40 mg daily, amlodipine 5 mg daily, metoprolol succinate 50 mg daily   Stress 01/25/2023 negative for ischemia  TTE 01/25/2023 with LVEF 55-60, moderate concentric LVH and mild AS  Seen by Nancy Cruz NP, 09/18/2023 - reporting exertional SOB, exertional chest pressure, fatigue - echo and [facility] ordered  TTE 09/22/2023 with LVEF 60-65%, basal septal hypertrophy (sigmoid septum). mild MR; aortic valve area of 2.27 cm2, peak velocity of 2.27 m/s across AV, peak and mean gradient of 20 mmHg and 12 mmHg respectively across the AV, gradient appears to be predominantly subaortic at the level of LVOT due to sigmoid shaped septum. With elevated flow across LVOT as suggested by elevated LVOT VTI.   C 09/22/2023 with mild non-obstructive CAD  Cardiac MRI 11/02/2023 with mild thickening at the level of the basal septum (maximal wall thickness 1.2 cm) and peak provoked LV outflow tract gradient of 27 mm Hg with differential diagnosis including ventricular septal bulge for longstanding hypertension or less likely a subtle form of hypertrophic obstructive cardiomyopathy.  Patient reports persistent exertional SOB and fatigue  CTA chest 08/2023 with findings suggestive of pulmonary fibrosis  Recommend to f/u with PCP re: pulmonology referral  Followup with Nancy Cruz NP, in 6 months    2) Hyperlipidemia  Goal LDL <70  Atorvastatin previously discontinued  On rosuvastatin 40 mg daily   Lipid panel 06/2022 with elevated LDL of 103   Due for repeat lipid panel 12/2023    3) HTN  Stable  On amlodipine 5 mg (increased from 2.5 mg  on 09/18/2023, metoprolol succinate 50 mg daily   Improvement in LE edema with the furosemide - requesting alternative s/t urinary frequency  Furosemide previously discontinued s/t urinary frequency   Patient can increase HCTZ up to 25 mg if leg swelling persists/does not improve, but to call our office if he does     4) BLE Discomfort and Intermittent Claudication   Able to use Elliptical  Unable to tolerate treadmill  DIANNE 11/02/2023 negative for arterial occlusive disease bilaterally   Reports persistent BLE discomfort - likely MSK related, ? Arthritis  Recommend to followup with PCP re: orthopedic referral      Scribe Attestation  By signing my name below, I, Sharlene Calderon   attest that this documentation has been prepared under the direction and in the presence of Jayson Tang MD.

## 2023-11-17 ENCOUNTER — OFFICE VISIT (OUTPATIENT)
Dept: CARDIOLOGY | Facility: CLINIC | Age: 73
End: 2023-11-17
Payer: MEDICARE

## 2023-11-17 VITALS
DIASTOLIC BLOOD PRESSURE: 74 MMHG | WEIGHT: 225 LBS | HEIGHT: 68 IN | HEART RATE: 77 BPM | BODY MASS INDEX: 34.1 KG/M2 | SYSTOLIC BLOOD PRESSURE: 150 MMHG

## 2023-11-17 DIAGNOSIS — I25.10 ARTERIOSCLEROTIC CORONARY ARTERY DISEASE: ICD-10-CM

## 2023-11-17 PROCEDURE — 3078F DIAST BP <80 MM HG: CPT | Performed by: INTERNAL MEDICINE

## 2023-11-17 PROCEDURE — 99213 OFFICE O/P EST LOW 20 MIN: CPT | Performed by: INTERNAL MEDICINE

## 2023-11-17 PROCEDURE — 1160F RVW MEDS BY RX/DR IN RCRD: CPT | Performed by: INTERNAL MEDICINE

## 2023-11-17 PROCEDURE — 3008F BODY MASS INDEX DOCD: CPT | Performed by: INTERNAL MEDICINE

## 2023-11-17 PROCEDURE — 3077F SYST BP >= 140 MM HG: CPT | Performed by: INTERNAL MEDICINE

## 2023-11-17 PROCEDURE — 1159F MED LIST DOCD IN RCRD: CPT | Performed by: INTERNAL MEDICINE

## 2023-11-17 PROCEDURE — 1126F AMNT PAIN NOTED NONE PRSNT: CPT | Performed by: INTERNAL MEDICINE

## 2023-11-17 PROCEDURE — 1036F TOBACCO NON-USER: CPT | Performed by: INTERNAL MEDICINE

## 2023-11-17 ASSESSMENT — ENCOUNTER SYMPTOMS
LOSS OF SENSATION IN FEET: 0
OCCASIONAL FEELINGS OF UNSTEADINESS: 0
DEPRESSION: 0

## 2023-11-17 NOTE — LETTER
November 17, 2023     Raghu Sotelo MD  [id number] [facility] [id number]    Patient: Moises Graham   YOB: 1950   Date of Visit: 11/17/2023       Dear Dr. Raghu Sotelo MD:    Thank you for referring Moises Graham to me for evaluation. Below are my notes for this consultation.  If you have questions, please do not hesitate to call me. I look forward to following your patient along with you.       Sincerely,     Jayson Tang MD      CC: No Recipients  ______________________________________________________________________________________    Counseling:  The patient was counseled regarding diagnostic results, instructions for management, risk factor reductions, prognosis, patient and family education, impressions, risks and benefits of treatment options and importance of compliance with treatment.      Chief Complaint:   The patient presents today for 1-month followup of persistent exertional SOB, chest pressure and fatigue s/p cardiac MRI, and BLE discomfort and intermittent claudication s/p DIANNE.     History Of Present Illness:    Moises Graham is a 73 year old male patient who presents today in the company of his wife for 1-month followup of persistent exertional SOB, chest pressure and fatigue s/p cardiac MRI, and BLE discomfort and intermittent claudication s/p DIANNE. His PMH is significant for advanced diabetic maculopathy, prostate CA, CAD with h/o NSTEMI s/p PCI of LAD 07/20/2022, HTN, and hyperlipidemia. On 11/02/2023, DIANNE was negative for arterial occlusive disease bilaterally, and cardiac MRI revealed mild thickening at the level of the basal septum (maximal wall thickness 1.2 cm) and peak provoked LV outflow tract gradient of 27 mm Hg with differential diagnosis including ventricular septal bulge for longstanding hypertension or less likely a subtle form of hypertrophic obstructive cardiomyopathy. Today, the patient reports persistent exertional SOB, BLE discomfort with pain in his  "buttocks and LE fatigue with ambulation, and fatigue. CTA of the chest performed in 08/2023 revealed findings suggestive of pulmonary fibrosis. The patient denies ever being seen by a pulmonologist.       Last Recorded Vitals:  Vitals:    11/17/23 1143   BP: 150/74   BP Location: Left arm   Pulse: 77   Weight: 102 kg (225 lb)   Height: 1.727 m (5' 8\")     Past Surgical History:  He has a past surgical history that includes CT angio coronary art with heartflow if score >30% (7/8/2022).      Social History:  He reports that he has never smoked. He has never used smokeless tobacco. He reports current alcohol use of about 4.0 standard drinks of alcohol per week. He reports that he does not use drugs.    Family History:  Family History   Family history unknown: Yes      Allergies:  Patient has no known allergies.    Outpatient Medications:  Current Outpatient Medications   Medication Instructions   • abiraterone (Zytiga) 250 mg tablet TAKE FOUR (4) TABLETS BY MOUTH ONCE A DAY IN THE MORNING. NO FOOD OR DRINK 2 HOURS BEFORE OR 1 HOUR AFTER ADMINISTRATION.   • amLODIPine (NORVASC) 1 mg, oral, Daily   • amLODIPine (NORVASC) 2.5 mg, oral   • aspirin 81 mg EC tablet 1 tablet, oral, Daily   • atorvastatin (Lipitor) 80 mg tablet 1 tablet, oral, Daily   • calcium citrate-vitamin D2 250 mg-2.5 mcg (100 unit) tablet 2 tablets, oral, 2 times daily   • cholecalciferol (VITAMIN D3) 1,000 Units, oral, Daily   • coenzyme Q-10 (CO Q-10) 10 mg, oral, Daily   • docusate sodium (Colace) 100 mg capsule 1 capsule, oral, 2 times daily   • ketoconazole (NIZOral) 2 % cream 1 Application, Topical, 2 times daily PRN, Apply a thin layer to affected areas   • leuprolide (6-month) (LUPRON DEPOT) 45 mg, intramuscular   • meloxicam (Mobic) 15 mg tablet 1 tablet, oral, As needed, Monday, Wednesday, and Friday   • metoprolol succinate XL (Toprol-XL) 50 mg 24 hr tablet 1 tablet, oral, Daily   • multivitamin tablet oral   • multivitamin-min-iron-FA-vit K " (Bariatric Multivitamins) 45 mg iron- 800 mcg-120 mcg capsule 1 capsule, oral   • Myrbetriq 50 mg tablet extended release 24 hr 24 hr tablet 1 tablet, oral, Daily   • nitroglycerin (Nitrostat) 0.4 mg SL tablet 1 tablet, oral, Every 5 min PRN   • pantoprazole (ProtoNix) 40 mg EC tablet 1 tablet, oral, Daily   • polyethylene glycol (GLYCOLAX, MIRALAX) 17 g, oral, As needed   • predniSONE (DELTASONE) 5 mg, oral, Daily   • rosuvastatin (CRESTOR) 40 mg, oral, Daily, Finish atorvastain before starting   • vitamin-B complex split tablet 1 half tablet, oral, Daily     Review of Systems   Constitutional: Positive for malaise/fatigue.   Cardiovascular:  Positive for dyspnea on exertion.   Neurological:         BLE discomfort, with pain in buttocks   All other systems reviewed and are negative.     Physical Exam:  Constitutional:       Appearance: Healthy appearance. Not in distress.   Neck:      Vascular: No JVR. JVD normal.   Pulmonary:      Effort: Pulmonary effort is normal.      Breath sounds: Normal breath sounds. No wheezing. No rhonchi. No rales.   Chest:      Chest wall: Not tender to palpatation.   Cardiovascular:      PMI at left midclavicular line. Normal rate. Regular rhythm. Normal S1. Normal S2.       Murmurs: There is no murmur.      No gallop.  No click. No rub.   Pulses:     Intact distal pulses.   Edema:     Peripheral edema absent.   Abdominal:      General: Bowel sounds are normal.      Palpations: Abdomen is soft.      Tenderness: There is no abdominal tenderness.   Musculoskeletal: Normal range of motion.         General: No tenderness. Skin:     General: Skin is warm and dry.   Neurological:      General: No focal deficit present.      Mental Status: Alert and oriented to person, place and time.        Last Labs:  CBC -  Lab Results   Component Value Date    WBC 7.1 10/10/2023    HGB 12.2 (L) 10/10/2023    HCT 37.4 (L) 10/10/2023    MCV 94 10/10/2023     10/10/2023       CMP -  Lab Results    Component Value Date    CALCIUM 9.1 10/10/2023    PROT 6.2 (L) 10/10/2023    ALBUMIN 4.2 10/10/2023    AST 26 10/10/2023    ALT 20 10/10/2023    ALKPHOS 94 10/10/2023    BILITOT 1.3 (H) 10/10/2023       LIPID PANEL -   Lab Results   Component Value Date    CHOL 151 04/28/2023    TRIG 123 04/28/2023    HDL 45.4 04/28/2023    CHHDL 3.3 04/28/2023    LDLF 81 04/28/2023    VLDL 25 04/28/2023       RENAL FUNCTION PANEL -   Lab Results   Component Value Date    GLUCOSE 125 (H) 10/10/2023     10/10/2023    K 4.0 10/10/2023     10/10/2023    CO2 27 10/10/2023    ANIONGAP 15 10/10/2023    BUN 16 10/10/2023    CREATININE 0.84 10/10/2023    GFRMALE >90 09/18/2023    CALCIUM 9.1 10/10/2023    ALBUMIN 4.2 10/10/2023        Lab Results   Component Value Date    BNP 56 06/21/2022       Last Cardiology Tests:  11/02/2023 - Vascular Lab PVR DIANNE  1. Right Lower PVR: No evidence of arterial occlusive disease in the right lower extremity at rest. Normal digital perfusion noted. Triphasic flow is noted in the right common femoral artery, right posterior tibial artery and right dorsalis pedis artery. Results called by waveforms/tracings due to partially non-compressible vessels.  2. Left Lower PVR: No evidence of arterial occlusive disease in the left lower extremity at rest. Normal digital perfusion noted. Triphasic flow is noted in the left common femoral artery, left posterior tibial artery and left dorsalis pedis artery.     11/02/2023 - Cardiac MRI  1. There is mild thickening at the level of the basal septum. Maximal wall thickness 1.2 cm. No Bj or subaortic membrane. Normal LV mass. Peak provoked LV outflow tract gradient 27 mm Hg. Differential diagnosis include ventricular septal bulge for longstanding hypertension or less likely a subtle form of hypertrophic obstructive cardiomyopathy.  2. There are no findings to suggest prior ischemic damage or an infiltrative process.    09/22/2023 - Cardiac Catheterization  (LH)  1. Mild non-obstructive coronary artery disease.  2. Left Ventricular end-diastolic pressure = 7.  3. Normal LV filling pressures.    09/22/2023 - TTE  1. Left ventricular systolic function is normal with a 60-65% estimated ejection fraction.  2. Spectral Doppler shows an impaired relaxation pattern of left ventricular diastolic filling.  3. Moderately elevated right ventricular systolic pressure.  4. There is basal septal hypertrophy(sigmoid septum).  5. Mild mitral valve regurgitation.  6. Aortic valve area of 2.27 cm2 (1.06 indexed ZARA) by continuity equation. Peak velocity of 2.27 m/s across AV. Peak and mean gradient of 20 mmHg and 12 mmHg respectively across the AV. The gradient appears to be predominantly subaortic at the level of LVOT due to sigmoid shaped septum. There is elevated flow across LVOT as suggested by elevated LVOT VTI. Can consider repeat study with valsalva to evaluate for provoked gradients if clinically indicated. Aortic valve DI of 0.72 and strove volume index of 40 L/min/m2.    01/25/2023 - TTE  1. Left ventricular systolic function is normal with a 55-60% estimated ejection fraction.  2. Spectral Doppler shows a pseudonormal pattern of left ventricular diastolic filling.  3. There is moderate concentric left ventricular hypertrophy.  4. Mild aortic valve stenosis.    01/05/2023 - Stress Test  1. No clinical evidence for ischemia at maximal workload.  2. Normal perfusion without evidence of ischemia or prior infarct. Calculated ejection fraction is 66% without segmental wall motion abnormality seen.     07/20/2022 - Cardiac Catheterization (LH)  1. Severe 1-vessel CAD involving mid LAD (RFR 0.86).  2. Mid LAD bridge.  3. Successful PCI of hemodynamically significant mid LAD 70% stenosis (RFR 0.86) with 3.5 x 18 mm Resolute [place] stent post-dilated with 3.5 mm NC balloon.    06/15/2022 - Onco-Cardiology TTE  1. The left ventricular systolic function is normal with a 65-70%  estimated ejection fraction.  2. Spectral Doppler shows an impaired relaxation pattern of left ventricular diastolic filling.  3. RVSP within normal limits.  4. Compared with the prior exam from 9/14/2016 the LV function remains unchanged. GLS was not assessed on the prior study.    06/29/2020 - Stress Test  1. Borderline ST response to moderate peak workload max. ETT. Occ. PVC's with exercise. Systolic and diastolic hypertensive response to exercise.   2. Normal stress myocardial perfusion imaging. Well-maintained left ventricular function. 64%     Diagnostic review: I have personally reviewed the result(s) of the DIANNE and Cardiac MRI.     Assessment/Plan  1) CAD s/p PCI of LAD in 7/2022, HOCM  He had been having recurrent episodes of Hematuria on ASA/Plavix  It was decided previously that since he had a single small stent in a 3.5 mm vessel and his risks of anticoagulation is much higher than benefits  Plavix was previously discontinued   Continue aspirin 81 mg daily, rosuvastatin 40 mg daily, amlodipine 5 mg daily, metoprolol succinate 50 mg daily   Stress 01/25/2023 negative for ischemia  TTE 01/25/2023 with LVEF 55-60, moderate concentric LVH and mild AS  Seen by Nancy Cruz NP, 09/18/2023 - reporting exertional SOB, exertional chest pressure, fatigue - echo and [facility] ordered  TTE 09/22/2023 with LVEF 60-65%, basal septal hypertrophy (sigmoid septum). mild MR; aortic valve area of 2.27 cm2, peak velocity of 2.27 m/s across AV, peak and mean gradient of 20 mmHg and 12 mmHg respectively across the AV, gradient appears to be predominantly subaortic at the level of LVOT due to sigmoid shaped septum. With elevated flow across LVOT as suggested by elevated LVOT VTI.   [facility] 09/22/2023 with mild non-obstructive CAD  Cardiac MRI 11/02/2023 with mild thickening at the level of the basal septum (maximal wall thickness 1.2 cm) and peak provoked LV outflow tract gradient of 27 mm Hg with differential diagnosis including  ventricular septal bulge for longstanding hypertension or less likely a subtle form of hypertrophic obstructive cardiomyopathy.  Patient reports persistent exertional SOB and fatigue  CTA chest 08/2023 with findings suggestive of pulmonary fibrosis  Recommend to f/u with PCP re: pulmonology referral  Followup with Nancy Cruz NP, in 6 months    2) Hyperlipidemia  Goal LDL <70  Atorvastatin previously discontinued  On rosuvastatin 40 mg daily   Lipid panel 06/2022 with elevated LDL of 103   Due for repeat lipid panel 12/2023    3) HTN  Stable  On amlodipine 5 mg (increased from 2.5 mg on 09/18/2023, metoprolol succinate 50 mg daily   Improvement in LE edema with the furosemide - requesting alternative s/t urinary frequency  Furosemide previously discontinued s/t urinary frequency   Patient can increase HCTZ up to 25 mg if leg swelling persists/does not improve, but to call our office if he does     4) BLE Discomfort and Intermittent Claudication   Able to use Elliptical  Unable to tolerate treadmill  DIANNE 11/02/2023 negative for arterial occlusive disease bilaterally   Reports persistent BLE discomfort - likely MSK related, ? Arthritis  Recommend to followup with PCP re: orthopedic referral      Scribe Attestation  By signing my name below, I, Sharlene Calderon   attest that this documentation has been prepared under the direction and in the presence of Jayson Tang MD.

## 2023-11-17 NOTE — PATIENT INSTRUCTIONS
Continue all current medications as prescribed.   Dr. Tang has recommended that you followup with your primary care provider regarding a possible referral to an orthopedic specialist for further evaluation of the discomfort in your legs and buttocks.  Dr. Tang has also recommended that you inquire with your primary care provider about a referral to a pulmonologist.  Followup with Nancy Cruz NP, in 6 months.      If you have any questions or cardiac concerns, please call our office at [phone].

## 2023-12-06 PROCEDURE — RXMED WILLOW AMBULATORY MEDICATION CHARGE

## 2023-12-12 ENCOUNTER — PHARMACY VISIT (OUTPATIENT)
Dept: PHARMACY | Facility: CLINIC | Age: 73
End: 2023-12-12
Payer: COMMERCIAL

## 2024-01-08 ENCOUNTER — SPECIALTY PHARMACY (OUTPATIENT)
Dept: PHARMACY | Facility: CLINIC | Age: 74
End: 2024-01-08

## 2024-01-08 PROCEDURE — RXMED WILLOW AMBULATORY MEDICATION CHARGE

## 2024-01-10 ENCOUNTER — LAB (OUTPATIENT)
Dept: LAB | Facility: LAB | Age: 74
End: 2024-01-10
Payer: MEDICARE

## 2024-01-10 ENCOUNTER — PHARMACY VISIT (OUTPATIENT)
Dept: PHARMACY | Facility: CLINIC | Age: 74
End: 2024-01-10
Payer: COMMERCIAL

## 2024-01-10 DIAGNOSIS — I25.10 ATHEROSCLEROTIC HEART DISEASE OF NATIVE CORONARY ARTERY WITHOUT ANGINA PECTORIS: Primary | ICD-10-CM

## 2024-01-10 DIAGNOSIS — R07.89 OTHER CHEST PAIN: ICD-10-CM

## 2024-01-10 DIAGNOSIS — C61 MALIGNANT NEOPLASM OF PROSTATE (MULTI): ICD-10-CM

## 2024-01-10 DIAGNOSIS — I10 ESSENTIAL (PRIMARY) HYPERTENSION: ICD-10-CM

## 2024-01-10 DIAGNOSIS — E78.5 HYPERLIPIDEMIA, UNSPECIFIED: ICD-10-CM

## 2024-01-10 LAB
ALBUMIN SERPL BCP-MCNC: 4.3 G/DL (ref 3.4–5)
ALP SERPL-CCNC: 84 U/L (ref 33–136)
ALT SERPL W P-5'-P-CCNC: 15 U/L (ref 10–52)
ANION GAP SERPL CALC-SCNC: 10 MMOL/L (ref 10–20)
AST SERPL W P-5'-P-CCNC: 22 U/L (ref 9–39)
BASOPHILS # BLD AUTO: 0.06 X10*3/UL (ref 0–0.1)
BASOPHILS NFR BLD AUTO: 0.9 %
BILIRUB SERPL-MCNC: 1 MG/DL (ref 0–1.2)
BUN SERPL-MCNC: 17 MG/DL (ref 6–23)
CALCIUM SERPL-MCNC: 9.3 MG/DL (ref 8.6–10.6)
CHLORIDE SERPL-SCNC: 106 MMOL/L (ref 98–107)
CHOLEST SERPL-MCNC: 130 MG/DL (ref 0–199)
CHOLESTEROL/HDL RATIO: 2.6
CO2 SERPL-SCNC: 33 MMOL/L (ref 21–32)
CREAT SERPL-MCNC: 0.69 MG/DL (ref 0.5–1.3)
EGFRCR SERPLBLD CKD-EPI 2021: >90 ML/MIN/1.73M*2
EOSINOPHIL # BLD AUTO: 0.25 X10*3/UL (ref 0–0.4)
EOSINOPHIL NFR BLD AUTO: 3.8 %
ERYTHROCYTE [DISTWIDTH] IN BLOOD BY AUTOMATED COUNT: 13.7 % (ref 11.5–14.5)
GLUCOSE SERPL-MCNC: 89 MG/DL (ref 74–99)
HCT VFR BLD AUTO: 37.5 % (ref 41–52)
HDLC SERPL-MCNC: 49.7 MG/DL
HGB BLD-MCNC: 12.6 G/DL (ref 13.5–17.5)
IMM GRANULOCYTES # BLD AUTO: 0.02 X10*3/UL (ref 0–0.5)
IMM GRANULOCYTES NFR BLD AUTO: 0.3 % (ref 0–0.9)
LDLC SERPL CALC-MCNC: 64 MG/DL
LYMPHOCYTES # BLD AUTO: 2.27 X10*3/UL (ref 0.8–3)
LYMPHOCYTES NFR BLD AUTO: 34.9 %
MCH RBC QN AUTO: 30.7 PG (ref 26–34)
MCHC RBC AUTO-ENTMCNC: 33.6 G/DL (ref 32–36)
MCV RBC AUTO: 92 FL (ref 80–100)
MONOCYTES # BLD AUTO: 0.58 X10*3/UL (ref 0.05–0.8)
MONOCYTES NFR BLD AUTO: 8.9 %
NEUTROPHILS # BLD AUTO: 3.33 X10*3/UL (ref 1.6–5.5)
NEUTROPHILS NFR BLD AUTO: 51.2 %
NON HDL CHOLESTEROL: 80 MG/DL (ref 0–149)
NRBC BLD-RTO: 0 /100 WBCS (ref 0–0)
PLATELET # BLD AUTO: 200 X10*3/UL (ref 150–450)
POTASSIUM SERPL-SCNC: 4.5 MMOL/L (ref 3.5–5.3)
PROT SERPL-MCNC: 6.1 G/DL (ref 6.4–8.2)
PSA SERPL-MCNC: <0.1 NG/ML
RBC # BLD AUTO: 4.1 X10*6/UL (ref 4.5–5.9)
SODIUM SERPL-SCNC: 144 MMOL/L (ref 136–145)
TESTOST SERPL-MCNC: <30 NG/DL (ref 240–1000)
TRIGL SERPL-MCNC: 82 MG/DL (ref 0–149)
VLDL: 16 MG/DL (ref 0–40)
WBC # BLD AUTO: 6.5 X10*3/UL (ref 4.4–11.3)

## 2024-01-10 PROCEDURE — 84153 ASSAY OF PSA TOTAL: CPT

## 2024-01-10 PROCEDURE — 80053 COMPREHEN METABOLIC PANEL: CPT

## 2024-01-10 PROCEDURE — 80061 LIPID PANEL: CPT

## 2024-01-10 PROCEDURE — 85025 COMPLETE CBC W/AUTO DIFF WBC: CPT

## 2024-01-10 PROCEDURE — 36415 COLL VENOUS BLD VENIPUNCTURE: CPT

## 2024-01-10 PROCEDURE — 84403 ASSAY OF TOTAL TESTOSTERONE: CPT

## 2024-01-11 ENCOUNTER — APPOINTMENT (OUTPATIENT)
Dept: HEMATOLOGY/ONCOLOGY | Facility: HOSPITAL | Age: 74
End: 2024-01-11
Payer: MEDICARE

## 2024-01-18 ENCOUNTER — OFFICE VISIT (OUTPATIENT)
Dept: HEMATOLOGY/ONCOLOGY | Facility: HOSPITAL | Age: 74
End: 2024-01-18
Payer: MEDICARE

## 2024-01-18 VITALS
SYSTOLIC BLOOD PRESSURE: 127 MMHG | RESPIRATION RATE: 18 BRPM | BODY MASS INDEX: 34.83 KG/M2 | HEART RATE: 65 BPM | OXYGEN SATURATION: 98 % | DIASTOLIC BLOOD PRESSURE: 61 MMHG | WEIGHT: 229.06 LBS | TEMPERATURE: 97 F

## 2024-01-18 DIAGNOSIS — C61 MALIGNANT NEOPLASM OF PROSTATE (MULTI): ICD-10-CM

## 2024-01-18 PROCEDURE — 1126F AMNT PAIN NOTED NONE PRSNT: CPT | Performed by: INTERNAL MEDICINE

## 2024-01-18 PROCEDURE — 99214 OFFICE O/P EST MOD 30 MIN: CPT | Performed by: INTERNAL MEDICINE

## 2024-01-18 PROCEDURE — 1036F TOBACCO NON-USER: CPT | Performed by: INTERNAL MEDICINE

## 2024-01-18 PROCEDURE — 3048F LDL-C <100 MG/DL: CPT | Performed by: INTERNAL MEDICINE

## 2024-01-18 PROCEDURE — 3074F SYST BP LT 130 MM HG: CPT | Performed by: INTERNAL MEDICINE

## 2024-01-18 PROCEDURE — 3008F BODY MASS INDEX DOCD: CPT | Performed by: INTERNAL MEDICINE

## 2024-01-18 PROCEDURE — 3078F DIAST BP <80 MM HG: CPT | Performed by: INTERNAL MEDICINE

## 2024-01-18 PROCEDURE — 1159F MED LIST DOCD IN RCRD: CPT | Performed by: INTERNAL MEDICINE

## 2024-01-18 PROCEDURE — 1160F RVW MEDS BY RX/DR IN RCRD: CPT | Performed by: INTERNAL MEDICINE

## 2024-01-18 ASSESSMENT — PAIN SCALES - GENERAL: PAINLEVEL: 0-NO PAIN

## 2024-01-18 NOTE — PROGRESS NOTES
Medical Oncology Out Patient Clinic Note    Patient's Name: Moises Graham  MRN: [id number]  Date of Service: 1/18/2024  In- Person Visit: YES    Reason for Visit: f/u    HPI: 72 yo male known to us with Met CSPC on AA/P and ADT  Doing well with serologic response  Next LHRH in April  AEs related to low T including G1 fatigue and vasomotor      Updated PMHx  NOne  Review of Systems    Physical Exam:  /61 (BP Location: Left arm, Patient Position: Sitting, BP Cuff Size: Large adult)   Pulse 65   Temp 36.1 °C (97 °F) (Temporal)   Resp 18   Wt 104 kg (229 lb 0.9 oz)   SpO2 98%   BMI 34.83 kg/m²   ECOG Performance Status 0  HEENT: Normal  Skin: Normal  Psych: Appropriate mood      LABS  Lab Results   Component Value Date    PSA <0.10 01/10/2024    PSA <0.10 07/24/2023    PSA <0.10 04/28/2023     Lab Results   Component Value Date    WBC 6.5 01/10/2024    HGB 12.6 (L) 01/10/2024    HCT 37.5 (L) 01/10/2024    MCV 92 01/10/2024     01/10/2024     Lab Results   Component Value Date    GLUCOSE 89 01/10/2024    CALCIUM 9.3 01/10/2024     01/10/2024    K 4.5 01/10/2024    CO2 33 (H) 01/10/2024     01/10/2024    BUN 17 01/10/2024    CREATININE 0.69 01/10/2024     Lab Results   Component Value Date    TESTOSTERONE <30 (L) 01/10/2024     Lab Results   Component Value Date    ALT 15 01/10/2024    AST 22 01/10/2024    ALKPHOS 84 01/10/2024    BILITOT 1.0 01/10/2024       IMAGING  None    GENOMICS  NA    A/P Met CSPC  Doing well on ADT and NHA  Will continue on therapy and see us in 3 months when due for LHRH  Discussed AE management    Otoniel Wilson MD, FACP  Chief, Solid Tumor Oncology Division   Medical Oncology  Professor of Medicine and Urology  /[facility]

## 2024-02-05 ENCOUNTER — SPECIALTY PHARMACY (OUTPATIENT)
Dept: PHARMACY | Facility: CLINIC | Age: 74
End: 2024-02-05

## 2024-02-05 PROCEDURE — RXMED WILLOW AMBULATORY MEDICATION CHARGE

## 2024-02-07 ENCOUNTER — PHARMACY VISIT (OUTPATIENT)
Dept: PHARMACY | Facility: CLINIC | Age: 74
End: 2024-02-07
Payer: COMMERCIAL

## 2024-03-06 ENCOUNTER — SPECIALTY PHARMACY (OUTPATIENT)
Dept: PHARMACY | Facility: CLINIC | Age: 74
End: 2024-03-06

## 2024-03-06 PROCEDURE — RXMED WILLOW AMBULATORY MEDICATION CHARGE

## 2024-03-07 ENCOUNTER — PHARMACY VISIT (OUTPATIENT)
Dept: PHARMACY | Facility: CLINIC | Age: 74
End: 2024-03-07
Payer: COMMERCIAL

## 2024-03-15 DIAGNOSIS — E78.00 HYPERCHOLESTEROLEMIA: Primary | ICD-10-CM

## 2024-03-15 RX ORDER — ROSUVASTATIN CALCIUM 40 MG/1
40 TABLET, COATED ORAL DAILY
Qty: 90 TABLET | Refills: 3 | Status: SHIPPED | OUTPATIENT
Start: 2024-03-15

## 2024-03-18 DIAGNOSIS — I10 BENIGN ESSENTIAL HTN: Primary | ICD-10-CM

## 2024-03-18 RX ORDER — AMLODIPINE BESYLATE 5 MG/1
5 TABLET ORAL DAILY
Qty: 90 TABLET | Refills: 3 | Status: SHIPPED | OUTPATIENT
Start: 2024-03-18 | End: 2025-03-13

## 2024-03-18 NOTE — TELEPHONE ENCOUNTER
Received refill request via fax from [facility] in [place] for Amlodipine 5 mg    Last Appointment: 11/17/23 with Dr Tang   Next Appointment: 5/10/24 with Nancy Cruz  Last Refilled: 9/18/23 90 days 1 refill

## 2024-04-03 ENCOUNTER — SPECIALTY PHARMACY (OUTPATIENT)
Dept: PHARMACY | Facility: CLINIC | Age: 74
End: 2024-04-03

## 2024-04-03 PROCEDURE — RXMED WILLOW AMBULATORY MEDICATION CHARGE

## 2024-04-05 ENCOUNTER — PHARMACY VISIT (OUTPATIENT)
Dept: PHARMACY | Facility: CLINIC | Age: 74
End: 2024-04-05
Payer: COMMERCIAL

## 2024-04-10 ENCOUNTER — LAB (OUTPATIENT)
Dept: LAB | Facility: LAB | Age: 74
End: 2024-04-10
Payer: MEDICARE

## 2024-04-10 DIAGNOSIS — C61 MALIGNANT NEOPLASM OF PROSTATE (MULTI): ICD-10-CM

## 2024-04-10 LAB
PSA SERPL-MCNC: <0.1 NG/ML
TESTOST SERPL-MCNC: <30 NG/DL (ref 240–1000)

## 2024-04-10 PROCEDURE — 84403 ASSAY OF TOTAL TESTOSTERONE: CPT

## 2024-04-10 PROCEDURE — 36415 COLL VENOUS BLD VENIPUNCTURE: CPT

## 2024-04-10 PROCEDURE — 84153 ASSAY OF PSA TOTAL: CPT

## 2024-04-17 ENCOUNTER — OFFICE VISIT (OUTPATIENT)
Dept: HEMATOLOGY/ONCOLOGY | Facility: HOSPITAL | Age: 74
End: 2024-04-17
Payer: MEDICARE

## 2024-04-17 ENCOUNTER — INFUSION (OUTPATIENT)
Dept: HEMATOLOGY/ONCOLOGY | Facility: HOSPITAL | Age: 74
End: 2024-04-17
Payer: MEDICARE

## 2024-04-17 VITALS
BODY MASS INDEX: 35.06 KG/M2 | RESPIRATION RATE: 16 BRPM | DIASTOLIC BLOOD PRESSURE: 62 MMHG | HEART RATE: 64 BPM | OXYGEN SATURATION: 100 % | TEMPERATURE: 96.6 F | SYSTOLIC BLOOD PRESSURE: 146 MMHG | WEIGHT: 230.6 LBS

## 2024-04-17 DIAGNOSIS — C61 MALIGNANT NEOPLASM OF PROSTATE (MULTI): ICD-10-CM

## 2024-04-17 LAB
ALBUMIN SERPL BCP-MCNC: 4.2 G/DL (ref 3.4–5)
ALP SERPL-CCNC: 87 U/L (ref 33–136)
ALT SERPL W P-5'-P-CCNC: 14 U/L (ref 10–52)
ANION GAP SERPL CALC-SCNC: 12 MMOL/L (ref 10–20)
AST SERPL W P-5'-P-CCNC: 24 U/L (ref 9–39)
BASOPHILS # BLD AUTO: 0.04 X10*3/UL (ref 0–0.1)
BASOPHILS NFR BLD AUTO: 0.7 %
BILIRUB SERPL-MCNC: 1.2 MG/DL (ref 0–1.2)
BUN SERPL-MCNC: 19 MG/DL (ref 6–23)
CALCIUM SERPL-MCNC: 8.9 MG/DL (ref 8.6–10.3)
CHLORIDE SERPL-SCNC: 107 MMOL/L (ref 98–107)
CO2 SERPL-SCNC: 26 MMOL/L (ref 21–32)
CREAT SERPL-MCNC: 0.68 MG/DL (ref 0.5–1.3)
EGFRCR SERPLBLD CKD-EPI 2021: >90 ML/MIN/1.73M*2
EOSINOPHIL # BLD AUTO: 0.13 X10*3/UL (ref 0–0.4)
EOSINOPHIL NFR BLD AUTO: 2.3 %
ERYTHROCYTE [DISTWIDTH] IN BLOOD BY AUTOMATED COUNT: 13.2 % (ref 11.5–14.5)
GLUCOSE SERPL-MCNC: 119 MG/DL (ref 74–99)
HCT VFR BLD AUTO: 37.2 % (ref 41–52)
HGB BLD-MCNC: 12.5 G/DL (ref 13.5–17.5)
IMM GRANULOCYTES # BLD AUTO: 0.02 X10*3/UL (ref 0–0.5)
IMM GRANULOCYTES NFR BLD AUTO: 0.4 % (ref 0–0.9)
LYMPHOCYTES # BLD AUTO: 1.24 X10*3/UL (ref 0.8–3)
LYMPHOCYTES NFR BLD AUTO: 21.9 %
MCH RBC QN AUTO: 30.4 PG (ref 26–34)
MCHC RBC AUTO-ENTMCNC: 33.6 G/DL (ref 32–36)
MCV RBC AUTO: 91 FL (ref 80–100)
MONOCYTES # BLD AUTO: 0.39 X10*3/UL (ref 0.05–0.8)
MONOCYTES NFR BLD AUTO: 6.9 %
NEUTROPHILS # BLD AUTO: 3.83 X10*3/UL (ref 1.6–5.5)
NEUTROPHILS NFR BLD AUTO: 67.8 %
NRBC BLD-RTO: 0 /100 WBCS (ref 0–0)
PLATELET # BLD AUTO: 194 X10*3/UL (ref 150–450)
POTASSIUM SERPL-SCNC: 4.1 MMOL/L (ref 3.5–5.3)
PROT SERPL-MCNC: 6.3 G/DL (ref 6.4–8.2)
RBC # BLD AUTO: 4.11 X10*6/UL (ref 4.5–5.9)
SODIUM SERPL-SCNC: 141 MMOL/L (ref 136–145)
WBC # BLD AUTO: 5.7 X10*3/UL (ref 4.4–11.3)

## 2024-04-17 PROCEDURE — 1126F AMNT PAIN NOTED NONE PRSNT: CPT | Performed by: NURSE PRACTITIONER

## 2024-04-17 PROCEDURE — 96402 CHEMO HORMON ANTINEOPL SQ/IM: CPT

## 2024-04-17 PROCEDURE — 1036F TOBACCO NON-USER: CPT | Performed by: NURSE PRACTITIONER

## 2024-04-17 PROCEDURE — 1157F ADVNC CARE PLAN IN RCRD: CPT | Performed by: NURSE PRACTITIONER

## 2024-04-17 PROCEDURE — 2500000004 HC RX 250 GENERAL PHARMACY W/ HCPCS (ALT 636 FOR OP/ED): Mod: JZ | Performed by: NURSE PRACTITIONER

## 2024-04-17 PROCEDURE — 3077F SYST BP >= 140 MM HG: CPT | Performed by: NURSE PRACTITIONER

## 2024-04-17 PROCEDURE — 80053 COMPREHEN METABOLIC PANEL: CPT | Performed by: NURSE PRACTITIONER

## 2024-04-17 PROCEDURE — 85025 COMPLETE CBC W/AUTO DIFF WBC: CPT | Performed by: NURSE PRACTITIONER

## 2024-04-17 PROCEDURE — 99215 OFFICE O/P EST HI 40 MIN: CPT | Performed by: NURSE PRACTITIONER

## 2024-04-17 PROCEDURE — 3048F LDL-C <100 MG/DL: CPT | Performed by: NURSE PRACTITIONER

## 2024-04-17 PROCEDURE — 1159F MED LIST DOCD IN RCRD: CPT | Performed by: NURSE PRACTITIONER

## 2024-04-17 PROCEDURE — 36415 COLL VENOUS BLD VENIPUNCTURE: CPT

## 2024-04-17 PROCEDURE — 3078F DIAST BP <80 MM HG: CPT | Performed by: NURSE PRACTITIONER

## 2024-04-17 RX ORDER — ALBUTEROL SULFATE 0.83 MG/ML
3 SOLUTION RESPIRATORY (INHALATION) AS NEEDED
OUTPATIENT
Start: 2024-06-26

## 2024-04-17 RX ORDER — EPINEPHRINE 0.3 MG/.3ML
0.3 INJECTION SUBCUTANEOUS EVERY 5 MIN PRN
OUTPATIENT
Start: 2024-06-26

## 2024-04-17 RX ORDER — DIPHENHYDRAMINE HYDROCHLORIDE 50 MG/ML
50 INJECTION INTRAMUSCULAR; INTRAVENOUS AS NEEDED
OUTPATIENT
Start: 2024-06-26

## 2024-04-17 RX ORDER — FAMOTIDINE 10 MG/ML
20 INJECTION INTRAVENOUS ONCE AS NEEDED
OUTPATIENT
Start: 2024-06-26

## 2024-04-17 RX ADMIN — LEUPROLIDE ACETATE 45 MG: 45 INJECTION, SUSPENSION, EXTENDED RELEASE SUBCUTANEOUS at 10:29

## 2024-04-17 ASSESSMENT — PAIN SCALES - GENERAL: PAINLEVEL: 0-NO PAIN

## 2024-04-17 NOTE — PROGRESS NOTES
Patient ID: Moises Graham is a 74 y.o. male.  Attending Physician: Dr. Otoniel Wilson  Cancer Diagnosis:  Cancer Staging   No matching staging information was found for the patient.     Current Therapy:   ADT (Eligard 45 mg subcutaneous q24 weeks)  Abiraterone Acetate 1000 mg PO daily  Prednisone 5 mg PO daily    Subjective      Cancer History:  Oncology History   Malignant neoplasm of prostate (Multi)   8/18/2023 Initial Diagnosis    Malignant neoplasm of prostate (CMS/HCC)     12/13/2023 -  Chemotherapy    Abiraterone / PredniSONE, 84 Day Cycles       Prostate Cancer - [facility]  2012: RP with LND for intermediate-risk prostate cancer (iPSA/pT2c/GS7/negative margins and nodes). PSA was undetectable after surgery  2016: BCR and underwent salvage RT to prostate region, pre-RT PSA 2.39. No PSA response, serologic PD. He was started on ADT with Relugolix as part of a clinical trial, last 2018  7/2019: PSA purvi 0.6. Axumin PET without metastatic disease.  Late 2019: serologic PD and started again on ADT with Lupron. Initial PSA decline, however subsequent PSA rise, on observation. PSADT since 4-6 mos.   Summer 2021:   10/2021: PSMA PET revealed multiple lymph nodes (Pelvic/PRLNs and mediastinal LNs) without bone disease.   11/2021: Started ADT and AA/P with PSA response.  1/17/2023: PSA undetectable  4/28/2023: PSA undetectable  2023: negative genetic testing  7/2023: PSA undetectable    Other Providers:  Dr. Raghu Sotelo, PCP  Dr. Jayson Tang, cardiology  Dr. Victor Manuel Tang, urology    Interval History:  Mr. Graham presents today in follow up. Continues to feel well overall. However, he continues to have low back pain. This is inhibiting his walking, and he is not able to do walking for longer periods of time. Was OK mowing the yard yesterday for over an hour, but walking alone is difficult. He believes it feels like spinal stenosis, and would like to know if he has options for management. Otherwise, energy is about  the same, manageable. Working out regularly and taking calcium/D. No other new or concerning symptoms at this time. The remainder of his ROS is otherwise negative.    HPI    Objective    BSA: 2.24 meters squared  Wt 105 kg (230 lb 9.6 oz)   BMI 35.06 kg/m²     Physical Exam  PHYSICAL EXAM:   General: alert, well-dressed in NAD. Speech is fluent and coherent, words clear. Good insight. Oriented x4  Skin: warm, dry, and pink without cyanosis or nail clubbing. No rash, petechiae, or ecchymoses  HEENT: Normocephalic atraumatic. Sclera white, conjunctiva pink. EOMs intact. Hearing intact to spoken voice. No visible lesions  Respiratory: Chest expansion symmetric. No audible wheeze. Unlabored breathing.  CV: Good color  Psych: engaged, polite, appropriate conversation and eye contact.    Current Medications:    Current Outpatient Medications:     abiraterone (Zytiga) 250 mg tablet, TAKE FOUR (4) TABLETS BY MOUTH ONCE A DAY IN THE MORNING. NO FOOD OR DRINK 2 HOURS BEFORE OR 1 HOUR AFTER ADMINISTRATION., Disp: 120 tablet, Rfl: 11    amLODIPine (Norvasc) 5 mg tablet, Take 1 tablet (5 mg) by mouth once daily., Disp: 90 tablet, Rfl: 3    aspirin 81 mg EC tablet, Take 1 tablet (81 mg) by mouth once daily., Disp: , Rfl:     atorvastatin (Lipitor) 80 mg tablet, Take 1 tablet (80 mg) by mouth once daily., Disp: , Rfl:     calcium citrate-vitamin D2 250 mg-2.5 mcg (100 unit) tablet, Take 2 tablets by mouth 2 times a day., Disp: , Rfl:     cholecalciferol (Vitamin D3) 25 MCG (1000 UT) tablet, Take 1 tablet (1,000 Units) by mouth once daily., Disp: , Rfl:     coenzyme Q-10 (Co Q-10) 10 mg capsule, Take 1 capsule (10 mg) by mouth once daily., Disp: , Rfl:     docusate sodium (Colace) 100 mg capsule, Take 1 capsule (100 mg) by mouth twice a day., Disp: , Rfl:     ketoconazole (NIZOral) 2 % cream, Apply 1 Application topically 2 times a day as needed. Apply a thin layer to affected areas, Disp: , Rfl:     leuprolide, 6-month, (Lupron  Depot) 45 mg injection, Inject 45 mg into the muscle., Disp: , Rfl:     meloxicam (Mobic) 15 mg tablet, Take 1 tablet (15 mg) by mouth if needed. Monday, Wednesday, and Friday, Disp: , Rfl:     metoprolol succinate XL (Toprol-XL) 50 mg 24 hr tablet, Take 1 tablet (50 mg) by mouth once daily., Disp: , Rfl:     multivitamin tablet, Take by mouth., Disp: , Rfl:     multivitamin-min-iron-FA-vit K (Bariatric Multivitamins) 45 mg iron- 800 mcg-120 mcg capsule, Take 1 capsule by mouth., Disp: , Rfl:     nitroglycerin (Nitrostat) 0.4 mg SL tablet, Take 1 tablet (0.4 mg) by mouth every 5 minutes if needed., Disp: , Rfl:     pantoprazole (ProtoNix) 40 mg EC tablet, Take 1 tablet (40 mg) by mouth once daily., Disp: , Rfl:     polyethylene glycol (Glycolax, Miralax) 17 gram/dose powder, Take 17 g by mouth if needed., Disp: , Rfl:     predniSONE (Deltasone) 5 mg tablet, Take 1 tablet (5 mg) by mouth once daily., Disp: 30 tablet, Rfl: 11    rosuvastatin (Crestor) 40 mg tablet, Take 1 tablet (40 mg) by mouth once daily., Disp: 90 tablet, Rfl: 3    vitamin-B complex split tablet, Take 1 half tablet by mouth once daily., Disp: , Rfl:      Most Recent Labs:  Results for orders placed or performed in visit on 04/10/24   PSA   Result Value Ref Range    Prostate Specific AG <0.10 <=4.00 ng/mL   Testosterone   Result Value Ref Range    Testosterone <30 (L) 240 - 1,000 ng/dL      Lab Results   Component Value Date    PSA <0.10 04/10/2024    PSA <0.10 01/10/2024    PSA <0.10 07/24/2023        Performance Status:  Symptomatic; fully ambulatory    Assessment/Plan   Moises Graham is a 74 y.o. male with metastatic prostate cancer to multiple LN's,  who presents in follow up ADT and AA/P. He looks and feels overall relatively well. Labs relatively unremarkable. PSA remains undetectable.    He continues to feel well. PSA and T both undetectable. CMP and CBC will be drawn today. I placed ortho spine referral.    # Prostate cancer  - Continue  ADT, due today, again due late September 2024  - Continue AA/P  - Continue to monitor PSA/T/CMP/CBC    # Fatigue  - CPM  - Monitor    # Low back pain  - Ortho spine referral    # Bone Health  - Continue calcium and vitamin D supplementation  - Continue regular, weight-bearing physical activity  - Last DEXA 7/2022 normal, Consider DEXA 7/2024, push a little bit back due to seeing spine and additional specialists at this time    # Health Maintenance  - Continue with PCP and other healthcare providers  - Continue exercise, heart-healthy diet    RTC 3 months for OV with Dr. Wilson  Scheduled 6 month follow up with me and injection today    Total time spent on this encounter was 41 minutes, which included preparation, direct time with patient, documentation, and care coordination on the day of visit.    Angelita Bean, MSN, APRN, AGNP-C, AOCNP  Associate Nurse Practitioner  [facility], [facility]

## 2024-04-17 NOTE — PROGRESS NOTES
[facility] Infusion Note  04/17/24    Moises Graham is a 74 y.o. year old male patient presenting to outpatient infusion for eligard injection.     Eligard given subcutaneous in R buttock.     Since the last visit, he reports doing well. Overall, he states that energy level is energy level is good. The patient is able to perform all ADLs. . Appetite has been unchanged and good. he reports no complaints.   There are no social work or other referral needs at this time.    Follow-up Plan: 9/20 injection    Varsha Lance RN

## 2024-04-23 ENCOUNTER — HOSPITAL ENCOUNTER (OUTPATIENT)
Dept: RADIOLOGY | Facility: CLINIC | Age: 74
Discharge: HOME | End: 2024-04-23
Payer: MEDICARE

## 2024-04-23 ENCOUNTER — OFFICE VISIT (OUTPATIENT)
Dept: ORTHOPEDIC SURGERY | Facility: CLINIC | Age: 74
End: 2024-04-23
Payer: MEDICARE

## 2024-04-23 DIAGNOSIS — M47.816 LUMBAR SPONDYLOSIS: Primary | ICD-10-CM

## 2024-04-23 DIAGNOSIS — M76.02 GLUTEAL TENDINITIS OF LEFT BUTTOCK: ICD-10-CM

## 2024-04-23 DIAGNOSIS — M47.816 LUMBAR SPONDYLOSIS: ICD-10-CM

## 2024-04-23 DIAGNOSIS — M99.73 CONNECTIVE TISSUE AND DISC STENOSIS OF INTERVERTEBRAL FORAMINA OF LUMBAR REGION: ICD-10-CM

## 2024-04-23 DIAGNOSIS — C61 MALIGNANT NEOPLASM OF PROSTATE (MULTI): ICD-10-CM

## 2024-04-23 PROCEDURE — 99203 OFFICE O/P NEW LOW 30 MIN: CPT | Performed by: PHYSICAL MEDICINE & REHABILITATION

## 2024-04-23 PROCEDURE — 3048F LDL-C <100 MG/DL: CPT | Performed by: PHYSICAL MEDICINE & REHABILITATION

## 2024-04-23 PROCEDURE — 1157F ADVNC CARE PLAN IN RCRD: CPT | Performed by: PHYSICAL MEDICINE & REHABILITATION

## 2024-04-23 PROCEDURE — 72100 X-RAY EXAM L-S SPINE 2/3 VWS: CPT

## 2024-04-23 PROCEDURE — 1159F MED LIST DOCD IN RCRD: CPT | Performed by: PHYSICAL MEDICINE & REHABILITATION

## 2024-04-23 PROCEDURE — 1160F RVW MEDS BY RX/DR IN RCRD: CPT | Performed by: PHYSICAL MEDICINE & REHABILITATION

## 2024-04-23 RX ORDER — METHOCARBAMOL 500 MG/1
TABLET, FILM COATED ORAL
Qty: 60 TABLET | Refills: 1 | Status: SHIPPED | OUTPATIENT
Start: 2024-04-23

## 2024-04-23 SDOH — SOCIAL STABILITY: SOCIAL NETWORK: SOCIAL ACTIVITY:: 5

## 2024-04-23 NOTE — PROGRESS NOTES
New Consult/New Patient Note    4/23/2024   Referred by: Angelita Bean, MELANIE-CNP    Assessment: This is a very pleasant 74 year old who presents with left buttock pain over the last 2-3 years. Exam is reassuring for lack of neurologic compromise. He does have pain over the gluteal tendon attachment at the left greater trochanter. On CT abdomen we were able to view degenerative disc disease and L3-L4 and L4-L5 with possible calcified disc at the L4-L5 level. His pain is likely due to discogenic pain along with gluteal tendinosis.  -Possible component of lumbar stenosis    PLAN:  1)  Imaging/Diagnostic Studies: Ordered lumbar xray. Reviewed the CT abdomen images to view the lumbar spine and saw degenerative disc disease and L3-L4 and L4-L5 with possible calcified disc at the L4-L5 level.  2)  Therapy/Rehabilitation: Referral for PT to work on core strengthening   3)  Pharmacological Management: Ordered robaxin to help with muscle spasms  4)  Spine/Surgical Interventions: None needed at this time  5)  Alternative Treatments: May consider alternative treatment options in the future including manipulation (chiropractor versus osteopathic) and/or acupuncture if patient does not obtain optimal relief with initial treatment plan.  6)  Consultations:  None at this time  7)  Follow -up: 6-8 weeks or PRN if symptoms worsen/do not improve.   8)  Future treatment considerations: Consider greater trochanter bursae injection or gabapentin or lyrica    Patient advised of the difference between hurt and harm and advised to continue with all normal activities and exercises. Patient verbalized understanding of the above plan and was happy with the care provided.      The above clinical summary has been dictated with voice recognition software. It has not been proofread for grammatical errors, typographical mistakes, or other semantic inconsistencies.    Thank you for visiting our office today. It was our pleasure to take part in your  healthcare.     Do not hesitate to call with any questions regarding your plan of care after leaving at [phone]    To clinicians, thank you very much for this kind referral. It is a privilege to partner with you in the care of your patients. My office would be delighted to assist you with any further consultations or with questions regarding the plan of care outlined. Do not hesitate to call the office or contact me directly.    Howard Maravilla DO, ATC   Physical Medicine and Rehabilitation PGY-3    Sincerely,    BELTRAN Ingram MD  , Physical Medicine and Rehabilitation, Orthopedic Spine  [facility] [employer]  [facility] [place]     Seen with resident Dr. Maravilla, note above and below is a joint effort in all areas.    I saw and evaluated the patient. I personally obtained the key and critical portions of the history and physical exam. I reviewed the resident's documentation and discussed the patient with the resident. I agree with the resident's medical decision making as documented in the resident's note, with my additions.      Moises Graham   is a 74 y.o. male who presents with left buttock pain over the last 2-3 years. There was no inciting event that started the pain and this pain has gotten worse over time. The pain is dull and walking longer than about 300 yards. No previous injury or surgery to this area in the past.     Location: left buttock   Radiation: no radiation  Quality: dull current 0/10,  at its worst  5-6/10  Exacerbated by walking makes it worse  Relieved by rest  Onset, traumatic event: no  Has tried:  Takes meloxicam three times a week but only helps his hands, no relief for back or buttock pain    Valsalva sign is negative   Grocery cart sign is negative  Smoker:  no   Does not wake them at night  Litigation: no    Patient denies bowel/bladder incontinence, denies fever, denies unintentional weight loss, denies  clumsiness of hands, feet, or dropping things.  Denies any constitutional or myelopathic symptomatology.      PREVIOUS TREATMENTS  IN THE LAST SIX MONTHS     Active conservative therapy  in the last six months (see below)              1. Physical therapy:  no                                                                                   2. Home exercise program after PT:  no                                                    3. A physician supervised home exercise program (HEP):  no               4. Chiropractic Care: no                                                                     Passive conservative therapy  in the last six months (see below)              1. NSAIDS: meloxicam                                                                                                          2. Prescription pain medication: takes prednisone                                                             3. Acupuncture: no                                                                                            4. Tens unit: no     Assistive Devices: no    Work status: Retired PT       ROS: Other than listed in HPI, PMHX below, and intake paperwork including a 30 point patient-recorded review of symptoms which was personally reviewed and inclusive of no history of unintentional weight loss, change in appetite, significant malaise, fevers, chills, or change in bowel/bladder, shortness of breath, or chest pain.    I have confirmed and edited as necessary Past Medical, Past Surgical, Family, Social History and ROS as obtained by others. These were also obtained on new patient forms.      PHYSICAL EXAM:   GENERAL APPEARANCE:  Well nourished, well developed, and no apparent distress.  NEURO PSYCH: Patient oriented to person, place, Mood pleasant. Benign affect.  MUSCULOSKELETAL and NEUROLOGICAL       VISUAL INSPECTION           LUMBAR: WNL  SPINE ROM:   LUMBAR ROM: within normal limits      PALPATION: tenderness over the  gluteal tendon attaching to the left greater trochanter           SPINOUS PROCESS: no tenderness           PARASPINALS: no tenderness  FACET LOADING: pain on both sides  MUSCLE BULK: Normal and symmetrical in the lower extremities.  MUSCLE TONE: Normal  MOTOR: 5/5 in all muscle groups tested in bilateral upper and lower extremities   SENSORY: Normal sensory exam to light touch  GAIT: Normal.  Able to go up and heels and toes with no sig. weakness.  No sig. balance deficit appreciated  REFLEXES: +2 to bilateral L extremities  LONG TRACT SIGNS: No clonus  STRAIGHT LEG TEST: negative  PERIPHERAL JOINT ROM:   HIP ROM: Limited in internal rotation bilaterally  MADISON/Thigh Thrust/Compression/Montrell Finger:  Negative bilaterally   Hip Exam including thigh thrust and LOG ROLL: Negative bilaterally      DATA REVIEW:   The below imaging studies were personally reviewed and discussed with the patient.    Medical Decision Making:  The above note constitutes a Moderate to High level of medical decision making based on past data and imaging review, new and chronic symptoms with exacerbation, change in weakness or sensation, new imaging and diagnostic studies ordered, discussion of potential interventional or surgical treatment options, acute or chronic pain that may pose a threat to bodily function.    Past Medical History:   Diagnosis Date    Personal history of malignant neoplasm of prostate     History of malignant neoplasm of prostate    Personal history of other diseases of the circulatory system     History of abnormal electrocardiography    Personal history of other diseases of the circulatory system     History of pericarditis       Medication Documentation Review Audit       Reviewed by ERICA Dominguez (Patient Care Technician) on 04/17/24 at 0900      Medication Order Taking? Sig Documenting Provider Last Dose Status   abiraterone (Zytiga) 250 mg tablet [id number] Yes TAKE FOUR (4) TABLETS BY MOUTH ONCE A DAY IN THE  MORNING. NO FOOD OR DRINK 2 HOURS BEFORE OR 1 HOUR AFTER ADMINISTRATION. Angelita Bean, APRN-CNP Taking Active   amLODIPine (Norvasc) 5 mg tablet [id number] Yes Take 1 tablet (5 mg) by mouth once daily. Nancy rCuz, APRN-CNP Taking Active   aspirin 81 mg EC tablet [id number] Yes Take 1 tablet (81 mg) by mouth once daily. Historical MD Andrae Taking Active   atorvastatin (Lipitor) 80 mg tablet [id number] Yes Take 1 tablet (80 mg) by mouth once daily. Alex Abebe MD Taking Active   calcium citrate-vitamin D2 250 mg-2.5 mcg (100 unit) tablet [id number] Yes Take 2 tablets by mouth 2 times a day. Alex Abebe MD Taking Active   cholecalciferol (Vitamin D3) 25 MCG (1000 UT) tablet [id number] Yes Take 1 tablet (1,000 Units) by mouth once daily. Alex Abebe MD Taking Active   coenzyme Q-10 (Co Q-10) 10 mg capsule [id number] Yes Take 1 capsule (10 mg) by mouth once daily. Alex Abebe MD Taking Active   docusate sodium (Colace) 100 mg capsule [id number] Yes Take 1 capsule (100 mg) by mouth twice a day. Historical MD Andrae Taking Active   ketoconazole (NIZOral) 2 % cream [id number] Yes Apply 1 Application topically 2 times a day as needed. Apply a thin layer to affected areas Historical MD Andrae Taking Active   leuprolide, 6-month, (Lupron Depot) 45 mg injection [id number] Yes Inject 45 mg into the muscle. Alex Abebe MD Taking Active   meloxicam (Mobic) 15 mg tablet [id number] Yes Take 1 tablet (15 mg) by mouth if needed. Monday, Wednesday, and Friday Alex Abebe MD Taking Active   metoprolol succinate XL (Toprol-XL) 50 mg 24 hr tablet [id number] Yes Take 1 tablet (50 mg) by mouth once daily. Alex Abebe MD Taking Active   multivitamin tablet [id number] Yes Take by mouth. Alex Abebe MD Taking Active   multivitamin-min-iron-FA-vit K (Bariatric Multivitamins) 45 mg iron- 800 mcg-120 mcg capsule [id number] Yes Take 1 capsule by mouth. Alex Abebe MD  Taking Active   nitroglycerin (Nitrostat) 0.4 mg SL tablet [id number] Yes Take 1 tablet (0.4 mg) by mouth every 5 minutes if needed. Historical Provider, MD Taking Active   pantoprazole (ProtoNix) 40 mg EC tablet [id number] Yes Take 1 tablet (40 mg) by mouth once daily. Historical Provider, MD Taking Active   polyethylene glycol (Glycolax, Miralax) 17 gram/dose powder [id number] Yes Take 17 g by mouth if needed. Historical Provider, MD Taking Active   predniSONE (Deltasone) 5 mg tablet [id number] Yes Take 1 tablet (5 mg) by mouth once daily. Angelita Bean, APRN-CNP Taking Active   rosuvastatin (Crestor) 40 mg tablet [id number] Yes Take 1 tablet (40 mg) by mouth once daily. Nancy Cruz APRN-CNP Taking Active   vitamin-B complex split tablet [id number] Yes Take 1 half tablet by mouth once daily. Historical Provider, MD Taking Active                    No Known Allergies    Social History     Socioeconomic History    Marital status:      Spouse name: Not on file    Number of children: Not on file    Years of education: Not on file    Highest education level: Not on file   Occupational History    Not on file   Tobacco Use    Smoking status: Never    Smokeless tobacco: Never   Vaping Use    Vaping status: Never Used   Substance and Sexual Activity    Alcohol use: Yes     Alcohol/week: 4.0 standard drinks of alcohol     Types: 4 Standard drinks or equivalent per week    Drug use: Never    Sexual activity: Not on file   Other Topics Concern    Not on file   Social History Narrative    Not on file     Social Determinants of Health     Financial Resource Strain: Not on file   Food Insecurity: Not on file   Transportation Needs: No Transportation Needs (9/26/2022)    Received from [facility]     Lack of Transportation (Medical): No     Lack of Transportation (Non-Medical): No   Physical Activity: Not on file   Stress: Not on file   Social Connections: Not on file   Intimate Partner Violence: Not  on file   Housing Stability: Not on file       Past Surgical History:   Procedure Laterality Date    CT ANGIO CORONARY ART WITH HEARTFLOW IF SCORE >30%  7/8/2022    CT HEART CORONARY ANGIOGRAM 7/8/2022 [facility]

## 2024-05-02 PROCEDURE — RXMED WILLOW AMBULATORY MEDICATION CHARGE

## 2024-05-06 ENCOUNTER — SPECIALTY PHARMACY (OUTPATIENT)
Dept: PHARMACY | Facility: CLINIC | Age: 74
End: 2024-05-06

## 2024-05-06 PROBLEM — R79.89 LOW SERUM TESTOSTERONE: Status: ACTIVE | Noted: 2022-07-08

## 2024-05-06 PROBLEM — I25.10 ARTERIOSCLEROSIS OF CORONARY ARTERY: Status: ACTIVE | Noted: 2022-07-08

## 2024-05-06 PROBLEM — I25.10 ASHD (ARTERIOSCLEROTIC HEART DISEASE): Status: ACTIVE | Noted: 2019-01-15

## 2024-05-06 PROBLEM — N13.30 HYDRONEPHROSIS: Status: ACTIVE | Noted: 2024-05-06

## 2024-05-06 PROBLEM — I42.1 HYPERTROPHIC OBSTRUCTIVE CARDIOMYOPATHY (MULTI): Status: ACTIVE | Noted: 2024-05-06

## 2024-05-06 PROBLEM — R59.0 INTRA-ABDOMINAL LYMPHADENOPATHY: Status: ACTIVE | Noted: 2023-05-15

## 2024-05-06 PROBLEM — N20.1 CALCULUS OF URETER: Status: ACTIVE | Noted: 2024-05-06

## 2024-05-06 PROBLEM — M15.9 OSTEOARTHRITIS OF MULTIPLE JOINTS: Status: ACTIVE | Noted: 2018-07-09

## 2024-05-06 PROBLEM — Z85.828 HISTORY OF SKIN CANCER: Status: ACTIVE | Noted: 2023-07-20

## 2024-05-06 PROBLEM — Z86.79 HISTORY OF PERICARDITIS: Status: ACTIVE | Noted: 2024-05-06

## 2024-05-06 PROBLEM — Z85.828 HISTORY OF SKIN CANCER: Status: ACTIVE | Noted: 2022-10-15

## 2024-05-06 PROBLEM — R06.02 SHORTNESS OF BREATH: Status: ACTIVE | Noted: 2023-08-18

## 2024-05-06 PROBLEM — M20.5X9 HALLUX LIMITUS: Status: ACTIVE | Noted: 2024-05-06

## 2024-05-06 PROBLEM — I21.9 MYOCARDIAL INFARCTION (MULTI): Status: ACTIVE | Noted: 2023-09-14

## 2024-05-06 PROBLEM — I35.0 AORTIC VALVE STENOSIS: Status: ACTIVE | Noted: 2023-05-09

## 2024-05-06 PROBLEM — M79.671 PAIN IN RIGHT FOOT: Status: ACTIVE | Noted: 2018-01-23

## 2024-05-06 PROBLEM — D62 ACUTE POSTHEMORRHAGIC ANEMIA: Status: ACTIVE | Noted: 2022-10-15

## 2024-05-06 PROBLEM — I73.9 INTERMITTENT CLAUDICATION (CMS-HCC): Status: ACTIVE | Noted: 2024-05-06

## 2024-05-06 PROBLEM — A41.9 SEPSIS (MULTI): Status: ACTIVE | Noted: 2024-05-06

## 2024-05-06 PROBLEM — M47.816 SPONDYLOSIS OF LUMBAR SPINE: Status: ACTIVE | Noted: 2024-05-06

## 2024-05-06 PROBLEM — T83.9XXA URINARY CATHETER COMPLICATION (CMS-HCC): Status: ACTIVE | Noted: 2022-09-26

## 2024-05-06 PROBLEM — I10 BENIGN ESSENTIAL HYPERTENSION: Status: ACTIVE | Noted: 2022-10-05

## 2024-05-06 PROBLEM — M20.21 HALLUX RIGIDUS, RIGHT FOOT: Status: ACTIVE | Noted: 2018-01-23

## 2024-05-06 PROBLEM — E66.811 CLASS 1 OBESITY: Status: ACTIVE | Noted: 2022-10-15

## 2024-05-06 PROBLEM — R32 URINARY INCONTINENCE: Status: ACTIVE | Noted: 2023-05-04

## 2024-05-06 PROBLEM — E87.6 HYPOKALEMIA: Status: ACTIVE | Noted: 2022-10-15

## 2024-05-06 PROBLEM — N32.9 DISORDER OF BLADDER: Status: ACTIVE | Noted: 2022-10-05

## 2024-05-06 PROBLEM — R07.89 CHEST DISCOMFORT: Status: ACTIVE | Noted: 2023-01-25

## 2024-05-06 PROBLEM — Z20.822 CONTACT WITH AND (SUSPECTED) EXPOSURE TO COVID-19: Status: ACTIVE | Noted: 2022-10-05

## 2024-05-06 PROBLEM — E11.319 ADVANCED DIABETIC MACULOPATHY (MULTI): Status: ACTIVE | Noted: 2023-08-18

## 2024-05-06 PROBLEM — G47.00 INSOMNIA: Status: ACTIVE | Noted: 2018-07-09

## 2024-05-06 PROBLEM — R97.20 ELEVATED PROSTATE SPECIFIC ANTIGEN (PSA): Status: ACTIVE | Noted: 2021-08-02

## 2024-05-06 PROBLEM — R10.9 RIGHT FLANK PAIN: Status: ACTIVE | Noted: 2022-09-25

## 2024-05-06 PROBLEM — E66.9 CLASS 1 OBESITY: Status: ACTIVE | Noted: 2022-10-15

## 2024-05-06 RX ORDER — ACETAMINOPHEN 325 MG/1
TABLET ORAL EVERY 6 HOURS PRN
COMMUNITY
Start: 2022-07-20 | End: 2024-05-10 | Stop reason: WASHOUT

## 2024-05-06 RX ORDER — OXYCODONE HYDROCHLORIDE 5 MG/1
TABLET ORAL
COMMUNITY
Start: 2023-05-09 | End: 2024-05-10 | Stop reason: WASHOUT

## 2024-05-06 RX ORDER — DRONABINOL 2.5 MG/1
CAPSULE ORAL
COMMUNITY
Start: 2022-10-24 | End: 2024-05-10 | Stop reason: WASHOUT

## 2024-05-06 RX ORDER — MIRABEGRON 50 MG/1
50 TABLET, EXTENDED RELEASE ORAL
COMMUNITY
Start: 2023-04-10 | End: 2024-05-10 | Stop reason: WASHOUT

## 2024-05-06 RX ORDER — MULTIVIT WITH MINERALS/HERBS
TABLET ORAL
COMMUNITY

## 2024-05-08 ENCOUNTER — EVALUATION (OUTPATIENT)
Dept: PHYSICAL THERAPY | Facility: CLINIC | Age: 74
End: 2024-05-08
Payer: MEDICARE

## 2024-05-08 DIAGNOSIS — M47.816 SPONDYLOSIS OF LUMBAR SPINE: Primary | ICD-10-CM

## 2024-05-08 DIAGNOSIS — M99.73 CONNECTIVE TISSUE AND DISC STENOSIS OF INTERVERTEBRAL FORAMINA OF LUMBAR REGION: ICD-10-CM

## 2024-05-08 DIAGNOSIS — M47.816 LUMBAR SPONDYLOSIS: ICD-10-CM

## 2024-05-08 PROCEDURE — 97110 THERAPEUTIC EXERCISES: CPT | Mod: GP

## 2024-05-08 PROCEDURE — 97161 PT EVAL LOW COMPLEX 20 MIN: CPT | Mod: GP

## 2024-05-08 ASSESSMENT — PATIENT HEALTH QUESTIONNAIRE - PHQ9
2. FEELING DOWN, DEPRESSED OR HOPELESS: NOT AT ALL
1. LITTLE INTEREST OR PLEASURE IN DOING THINGS: NOT AT ALL
SUM OF ALL RESPONSES TO PHQ9 QUESTIONS 1 AND 2: 0

## 2024-05-08 ASSESSMENT — COLUMBIA-SUICIDE SEVERITY RATING SCALE - C-SSRS
6. HAVE YOU EVER DONE ANYTHING, STARTED TO DO ANYTHING, OR PREPARED TO DO ANYTHING TO END YOUR LIFE?: NO
2. HAVE YOU ACTUALLY HAD ANY THOUGHTS OF KILLING YOURSELF?: NO
1. IN THE PAST MONTH, HAVE YOU WISHED YOU WERE DEAD OR WISHED YOU COULD GO TO SLEEP AND NOT WAKE UP?: NO

## 2024-05-08 ASSESSMENT — ENCOUNTER SYMPTOMS
WHEEZING: 0
SYNCOPE: 0
ORTHOPNEA: 0
IRREGULAR HEARTBEAT: 0
CHILLS: 0
HEMATOCHEZIA: 0
VOMITING: 0
NAUSEA: 0
COUGH: 0
ALTERED MENTAL STATUS: 0
SHORTNESS OF BREATH: 0
PALPITATIONS: 0
HEMATURIA: 0
FEVER: 0
NEAR-SYNCOPE: 0

## 2024-05-08 NOTE — PROGRESS NOTES
Chief Complaint/Reason for Visit:  6 month follow up 6 month cardiovascular follow up    History Of Present Illness:    Moises Graham is a 74 y.o. male that presents to the office for 6 month follow up.    Taking medications as prescribed.     PMH is significant for advanced diabetic maculopathy, prostate CA, CAD with h/o NSTEMI s/p PCI of LAD 07/20/2022, HTN, and hypercholesterolemia.     He was previously on Brilinta s/p PCI and he developed worsening of his already present hematuria (he is followed closely with Dr. ARRON Tang, urologist); therefore, this was switched to Plavix. Plavix has since been d/c.     He can do routine exercise which includes 20 minutes of aerobic exercise and light to moderate.      He has been having some left hip pain with extended ambulation and has been following with orthopedic provider.  Currently participating in PT.     He is a physical therapist.    Past Medical History:  He has a past medical history of Personal history of malignant neoplasm of prostate, Personal history of other diseases of the circulatory system, and Personal history of other diseases of the circulatory system.    Past Surgical History:  He has a past surgical history that includes CT angio coronary art with heartflow if score >30% (7/8/2022).      Social History:  He reports that he has never smoked. He has never used smokeless tobacco. He reports current alcohol use of about 4.0 standard drinks of alcohol per week. He reports that he does not use drugs.    Family History:  Family History   Family history unknown: Yes        Allergies:  Patient has no known allergies.    Review of Systems   Constitutional: Positive for malaise/fatigue. Negative for chills and fever.   Cardiovascular:  Positive for dyspnea on exertion (chronic) and leg swelling (intermittent). Negative for chest pain, irregular heartbeat, near-syncope, orthopnea, palpitations and syncope.   Respiratory:  Negative for cough, shortness of breath  and wheezing.    Gastrointestinal:  Negative for hematochezia, melena, nausea and vomiting.   Genitourinary:  Negative for hematuria.   Psychiatric/Behavioral:  Negative for altered mental status.        Objective      Vitals reviewed.   Constitutional:       Appearance: Healthy appearance.   Pulmonary:      Effort: Pulmonary effort is normal.      Breath sounds: Normal breath sounds.   Cardiovascular:      PMI at left midclavicular line. Normal rate. Regular rhythm. S1 with normal intensity. S2 with normal intensity.       Murmurs: There is no murmur.   Edema:     Peripheral edema present.     Pretibial: bilateral trace non-pitting edema of the pretibial area.     Ankle: bilateral trace non-pitting edema of the ankle.     Feet: bilateral trace non-pitting edema of the feet.  Abdominal:      General: Bowel sounds are normal.   Skin:     General: Skin is warm and dry.   Psychiatric:         Attention and Perception: Attention normal.         Mood and Affect: Mood normal.         Behavior: Behavior is cooperative.         Current Outpatient Medications   Medication Instructions    abiraterone (Zytiga) 250 mg tablet TAKE FOUR (4) TABLETS BY MOUTH ONCE A DAY IN THE MORNING. NO FOOD OR DRINK 2 HOURS BEFORE OR 1 HOUR AFTER ADMINISTRATION.    amLODIPine (NORVASC) 5 mg, oral, Daily    aspirin 81 mg EC tablet 1 tablet, oral, Daily    calcium citrate-vitamin D2 250 mg-2.5 mcg (100 unit) tablet 2 tablets, oral, 2 times daily    cholecalciferol (VITAMIN D3) 2,000 Units, oral, Daily    coenzyme Q-10 (CO Q-10) 10 mg, oral, Daily    docusate sodium (Colace) 100 mg capsule 1 capsule, oral, 2 times daily    ketoconazole (NIZOral) 2 % cream 1 Application, Topical, 2 times daily PRN, Apply a thin layer to affected areas    leuprolide (6-month) (LUPRON DEPOT) 45 mg, intramuscular    meloxicam (Mobic) 15 mg tablet 1 tablet, oral, As needed, Monday, Wednesday, and Friday    methocarbamol (Robaxin) 500 mg tablet 1-2 tabs every 8 hrs as  "needed for muscle spasm    metoprolol succinate XL (Toprol-XL) 50 mg 24 hr tablet 1 tablet, oral, Daily    multivitamin tablet oral    nitroglycerin (Nitrostat) 0.4 mg SL tablet 1 tablet, oral, Every 5 min PRN    pantoprazole (ProtoNix) 40 mg EC tablet 1 tablet, oral, Daily    polyethylene glycol (GLYCOLAX, MIRALAX) 17 g, oral, As needed    predniSONE (DELTASONE) 5 mg, oral, Daily    rosuvastatin (CRESTOR) 40 mg, oral, Daily    vitamin B complex (B Complex-Vitamin B12) tablet oral        Last Labs:  CBC -  Lab Results   Component Value Date    WBC 5.7 04/17/2024    HGB 12.5 (L) 04/17/2024    HCT 37.2 (L) 04/17/2024    MCV 91 04/17/2024     04/17/2024       RENAL FUNCTION PANEL -   Lab Results   Component Value Date    GLUCOSE 119 (H) 04/17/2024     04/17/2024    K 4.1 04/17/2024     04/17/2024    CO2 26 04/17/2024    ANIONGAP 12 04/17/2024    BUN 19 04/17/2024    CREATININE 0.68 04/17/2024    GFRMALE >90 09/18/2023    CALCIUM 8.9 04/17/2024    ALBUMIN 4.2 04/17/2024        CMP -  Lab Results   Component Value Date    CALCIUM 8.9 04/17/2024    PROT 6.3 (L) 04/17/2024    ALBUMIN 4.2 04/17/2024    AST 24 04/17/2024    ALT 14 04/17/2024    ALKPHOS 87 04/17/2024    BILITOT 1.2 04/17/2024       LIPID PANEL -   Lab Results   Component Value Date    CHOL 130 01/10/2024    TRIG 82 01/10/2024    HDL 49.7 01/10/2024    CHHDL 2.6 01/10/2024    LDLF 81 04/28/2023    VLDL 16 01/10/2024    NHDL 80 01/10/2024     Lab Results   Component Value Date    LDLCALC 64 01/10/2024       Lab Results   Component Value Date    BNP 56 06/21/2022       No results found for: \"TSH\"    No results found for this or any previous visit.     Last Cardiology Tests:    DIANNE 11/2/23  Right Lower PVR: No evidence of arterial occlusive disease in the right lower extremity at rest. Normal digital perfusion noted. Triphasic flow is noted in the right common femoral artery, right posterior tibial artery and right dorsalis pedis artery. " Results called by waveforms/tracings due to partially non-compressible vessels.     Left Lower PVR: No evidence of arterial occlusive disease in the left lower extremity at rest. Normal digital perfusion noted. Triphasic flow is noted in the left common femoral artery, left posterior tibial artery and left dorsalis pedis artery.    Cardiac MRI 11/2/23  1. There is mild thickening at the level of the basal septum. Maximal  wall thickness 1.2 cm. No Bj or subaortic membrane. Normal LV mass.  Peak provoked LV outflow tract gradient 27 mm Hg. Differential  diagnosis include ventricular septal bulge for longstanding  hypertension or less likely a subtle form of hypertrophic obstructive  cardiomyopathy.  2. There are no findings to suggest prior ischemic damage or an  infiltrative process.    [facility] 9/22/23  1. Mild non-obstructive coronary artery disease.  2. Left Ventricular end-diastolic pressure = 7.  3. Normal LV filling pressures.    TTE 9/22/23  1. Left ventricular systolic function is normal with a 60-65% estimated ejection fraction.  2. Spectral Doppler shows an impaired relaxation pattern of left ventricular diastolic filling.  3. Moderately elevated right ventricular systolic pressure.  4. There is basal septal hypertrophy(sigmoid septum).  5. Mild mitral valve regurgitation.  6. Aortic valve area of 2.27 cm2 (1.06 indexed ZARA) by continuity equation. Peak velocity of 2.27 m/s across AV. Peak and mean gradient of 20 mmHg and 12 mmHg respectively across the AV. The gradient appears to be predominantly subaortic at the level of LVOT due to sigmoid shaped septum. There is elevated flow across LVOT as suggested by elevated LVOT VTI. Can consider repeat study with valsalva to evaluate for provoked gradients if clinically indicated. Aortic valve DI of 0.72 and strove volume index of 40 L/min/m2.    Stress test 1/25/2023 showed no clinical evidence for ischemia at maximal workload. Normal perfusion without evidence of  "ischemia or prior infarct. Calculated EF is 66% without segmental wall motion abnormality seen.     TTE 1/25/2023 showed LV systolic function is normal with an EF of 55 to 60%. Pseudonormal pattern of LV diastolic filling. Moderate concentric LVH. Mild aortic valve stenosis.     LHC performed 07/20/2022 revealed severe 1-vessel CAD involving the mid LAD, for which he underwent PCI of the mid LAD.    Visit Vitals  /68 (BP Location: Right arm, Patient Position: Sitting)   Pulse 64   Ht 1.727 m (5' 8\")   Wt 105 kg (230 lb 12.8 oz)   SpO2 99%   BMI 35.09 kg/m²   Smoking Status Never   BSA 2.24 m²       Assessment/Plan   The primary encounter diagnosis was ASHD (arteriosclerotic heart disease). Diagnoses of Benign essential hypertension, Dyslipidemia, Aortic valve stenosis, etiology of cardiac valve disease unspecified, and Arteriosclerotic coronary artery disease were also pertinent to this visit.    1. CAD s/p PCI of LAD in 7/2022  Previously, he had been having recurrent episodes of hematuria on ASA/Plavix  Plavix was previously d/c.   Continue aspirin 81 mg daily  Continue rosuvastatin 40 mg daily  Stress test Jan 2023 with no ischemia  TTE Jan 2023 with LVEF 55-60%, moderate concentric LVH, mild AS  LHC Sept 2023 with mild nonobstructive CAD     2. Hyperlipidemia  Goal LDL less than 70. He is at goal.  Continue rosuvastatin 40 mg daily  Mediterranean style of eating     3. HTN  Stable  Continue amlodipine 5 mg daily and metoprolol succinate 50 mg daily  Furosemide stopped previously d/t urinary frequency > he does state that he is s/p artificial urethral sphincter procedure and would be willing to try diuretic again, if needed.     4. Aortic valve stenosis  TTE January 2023 with mild AS  TTE Sept 2023 with mild aortic valve stenosis     5. Intermittent claudication  Denies any open wounds or sores  DIANNE Nov 2023 with no evidence of arterial occlusive disease BLE at rest    6. Abnormal echo   TTE Sept 2023 " Aortic valve area of 2.27 cm2 (1.06 indexed ZARA) by continuity equation. Peak velocity of 2.27 m/s across AV. Peak and mean gradient of 20 mmHg and 12 mmHg respectively across the AV. The gradient appears to be predominantly subaortic at the level of LVOT due to sigmoid shaped septum. There is elevated flow across LVOT as suggested by elevated LVOT VTI. Can consider repeat study with valsalva to evaluate for provoked gradients if clinically indicated. Aortic valve DI of 0.72 and strove volume index of 40 L/min/m2.   Cardiac MRI Nov 2023 There is mild thickening at the level of the basal septum. Maximal  wall thickness 1.2 cm. No TIKI or subaortic membrane. Peak provoked LV outflow tract gradient 27 mm Hg. Differential diagnosis include ventricular septal bulge for longstanding hypertension or less likely a subtle form of hypertrophic obstructive  cardiomyopathy.    7. Prostate cancer   Management per oncology    Nancy Cruz, APRN-CNP

## 2024-05-08 NOTE — PROGRESS NOTES
Physical Therapy Evaluation    Patient Name Moises Graham  MRN: [id number]  Today's Date: 05/08/24  Time Calculation  Start Time: 1210  Stop Time: 1245  Time Calculation (min): 35 min    Insurance: Payor: MEDICARE / Plan: MEDICARE PART A AND B / Product Type: *No Product type* / $0 applied  -authorization required: no  -number of visits authorized:  -Authorization/certification dates: 5/8/24-8/6/24 POC signed 5/8/24  Next MD appointment: 6/18/24  Visit # 1    Problem List Items Addressed This Visit             ICD-10-CM    Spondylosis of lumbar spine - Primary M47.816    Relevant Orders    Follow Up In Physical Therapy    Connective tissue and disc stenosis of intervertebral foramina of lumbar region M99.73    Relevant Orders    Follow Up In Physical Therapy     Other Visit Diagnoses         Codes    Lumbar spondylosis     M47.816    Relevant Orders    Follow Up In Physical Therapy            Onset Date: 5/1/2021  Referring Provider: Alysha Ingram MD   PT Orders: eval and treat    Assessment:    Impression/Clinical Presentation: chronic LBP that exacerbates with walking limiting ability to exercise    Skilled PT services will aid in advancing functional status and attaining therapy goals.    Problem List:  -activity limitations  -Functional limitations  -Pain L glute  -decreased  ROM  -decreased strength   -decreased flexibility  -posture awareness  -decreased knowledge of HEP    Goals:  STG 2 wks  Compliant with HEP  Dec pain 15%    LTG by discharge  I HEP  Improve functional outcome score to indicate improved functional mobility  Dec pain 50-75% on pain scale with improved walking tolerance  Inc AROM lumbar /L hip with improved LE dsg  Inc core strength 5/5  Inc LE flexibility WNL  Inc posture awareness    Rehab Potential:  good    Clinical Presentation:    stable/and/or uncomplicated characteristics                                            Level of  Complexity: low    Plan:    Therapeutic exercise, Manual therapy, Home program instruction and progression, Neuromuscular re-education, Therapeutic activities, and Cryotherapy    Nustep for soft tissue warmup, posture instruction/exercises, lumbar ROM, LE flexibility, DLS,   1 x week  until goals met or maximum rehab potential met  Pt is currently scheduled for 6 weeks    Plan of care was designed with input and agreement by the patient    Subjective:    Current Episode of Functional Impairment and/or Pain :  Chief complaint/HPI: noticed about 3 yrs ago that he was getting severe pain L glute area with walking  Has gotten a little worse  Can tolerate 2 laps on indoor track  Exercises 3-4 x wk at [facility]    Pain  Pain assessment 0-10  Pain score: 0 now, with walking 3/10  Pain location: L glute/greater trochanter  Type: dull    Exacerbating Factors: walking   Relieving Factors: sitting    Current Medical management:     PMHx: Reviewed medical history form with patient and medical screening assessed.    stage IV prostate cancer-mets to lymph nodes, prostatectomy/radiation,CAD, HTN     Medications for pain: meloxicam     Diagnostic Tests: xray-mod/severe spondylosis    Precautions: no fall risk  **prostate CA-no modalities    Functional Limitations: Walking    Home Living Situation: lives with wife  One story home    Prior Level of Function could walk w/o pain for exercise    Patient Stated Goal For Therapy inc core strength    Occupation: retired PT    Objective:    Ortho:  Lumbar ROM  FB: 90%  BB: 75%  RSB: 25%  LSB: 25%    Flexibility   Hamstrings B end range tightness  piriformis: B mod tight     Strength  BLE 5/5  abdominals: 4-/5  back extensors bridges 100% with core instability    Special Tests:     SLR: B -    posture: RS/FH, dec lumbar lordosis    palpation: no POP    Gait: I amb w/o device   Reciprocal stairs    Outcome Measures:  Other Measures  Oswestry Disablity Index (NICCI): 6     Treatment:    PT  Evaluation Time Entry  PT Evaluation (Low) Time Entry: 25  minutes    Therapeutic Exercise:                             10 minutes  SKC 10x  DKC 10x  LTR 10x  Supine hamstring stretch 10 sec 5x  Supine piriformis stretch 10 sec 5x  Supine windshield wipers 10x   Seated lumbar flexion stretch 10 sec 5x            Modalities:                                             Response to treatment: improved knowledge and understanding of condition    Education: Educated on relevant anatomy and expected plan of care  Instructed in initial HEP including reasoning of given exercises and issued written instructions

## 2024-05-08 NOTE — LETTER
May 8, 2024    Ruth Leos, PT  [address]   Rehabilitation Services  [facility] [address]    Patient: Moises Graham   YOB: 1950   Date of Visit: 5/8/2024       Dear Alysha Ingram MD  [address] [place] [address]    The attached plan of care is being sent to you because your patient’s medical reimbursement requires that you certify the plan of care. Your signature is required to allow uninterrupted insurance coverage.      You may indicate your approval by signing below and faxing this form back to us at Dept Fax: [phone].    Please call Dept: [phone] with any questions or concerns.    Thank you for this referral,        Ruth Leos, PT  [address] [facility] [address]  [address] [facility] [address]    Payer: Payor: MEDICARE / Plan: MEDICARE PART A AND B / Product Type: *No Product type* /                                                                         Date:     Dear Ruth Leos, PT,     Re: Mr. Moises Graham, MRN:[id number]    I certify that I have reviewed the attached plan of care and it is medically necessary for Mr. Moises Graham (1950) who is under my care.          ______________________________________                    _________________  Provider name and credentials                                           Date and time                                                                                           Plan of Care 5/8/24   Effective from: 5/8/2024  Effective to: 8/6/2024    Plan ID: [id number]            Participants as of Finalize on 5/8/2024    Name Type Comments Contact Info    Alysha Ingram MD Referring Provider  [phone]    Ruth Leos PT Physical Therapist  [phone]       Last Plan Note     Author: Ruth Leos PT Status: Incomplete Last edited: 5/8/2024 12:00 PM                                                       Physical Therapy Evaluation    Patient Name Moises CALZADA  Gladys  MRN: [id number]  Today's Date: 05/08/24       Insurance: Payor: MEDICARE / Plan: MEDICARE PART A AND B / Product Type: *No Product type* / $0 applied  -authorization required: no  -number of visits authorized:  -Authorization/certification dates: 5/8/24-8/6/24  Next MD appointment: 6/18/24  Visit # 1    Problem List Items Addressed This Visit             ICD-10-CM    Spondylosis of lumbar spine - Primary M47.816     Other Visit Diagnoses         Codes    Lumbar spondylosis     M47.816    Connective tissue and disc stenosis of intervertebral foramina of lumbar region     M99.73            Onset Date: 5/1/2021  Referring Provider: Alysha Ingram MD   PT Orders: eval and treat    Assessment:    Impression/Clinical Presentation: chronic LBP that exacerbates with walking limiting ability to exercise    Skilled PT services will aid in advancing functional status and attaining therapy goals.    Problem List:  -activity limitations  -Functional limitations  -Pain L glute  -decreased  ROM  -decreased strength   -decreased flexibility  -posture awareness  -decreased knowledge of HEP    Goals:  STG 2 wks  Compliant with HEP  Dec pain 15%    LTG by discharge  I HEP  Improve functional outcome score to indicate improved functional mobility  Dec pain 50-75% on pain scale with improved walking tolerance  Inc AROM lumbar /L hip with improved LE dsg  Inc core strength 5/5  Inc LE flexibility WNL    Rehab Potential:  good    Clinical Presentation:    stable/and/or uncomplicated characteristics                                            Level of Complexity: low    Plan:    Therapeutic exercise, Manual therapy, Home program instruction and progression, Neuromuscular re-education, Therapeutic activities, and Cryotherapy    Nustep for soft tissue warmup, posture instruction/exercises, lumbar ROM, LE flexibility, DLS, modalities prn   1 x week  until goals met or maximum rehab potential met  Pt is currently scheduled for 6  weeks    Plan of care was designed with input and agreement by the patient    Subjective:    Current Episode of Functional Impairment and/or Pain :  Chief complaint/HPI: noticed about 3 yrs ago that he was getting severe pain L glute area with walking  Has gotten a little worse  Can tolerate 2 laps on indoor track  Exercises 3[facility]4 x wk at [facility]    Pain  Pain assessment 0-10  Pain score: 0 now, with walking 3/10  Pain location: L glute/greater trochanter  Type: dull    Exacerbating Factors: walking   Relieving Factors: sitting    Current Medical management:     PMHx: Reviewed medical history form with patient and medical screening assessed.    stage IV prostate cancer-mets to lymph nodes, prostatectomy/radiation,CAD, HTN     Medications for pain: meloxicam     Diagnostic Tests: xray-mod/severe spondylosis    Precautions: no fall risk    Functional Limitations: Walking    Home Living Situation: lives with wife  One story home    Prior Level of Function could walk w/o pain for exercise    Patient Stated Goal For Therapy inc core strength    Occupation: retired PT    Objective:    Ortho:  Lumbar ROM  FB: 90%  BB: 75%  RSB: 25%  LSB: 25%    Flexibility   Hamstrings B end range tightness  piriformis: B mod tight     Strength  BLE 5/5  abdominals: 4-/5  back extensors bridges 100% with core instability    Special Tests:     SLR: B -    posture: RS/FH, dec lumbar lordosis    palpation: no POP    Gait: I amb w/o device   Reciprocal stairs    Outcome Measures:  Other Measures  Oswestry Disablity Index (NICCI): 6     Treatment:       minutes    Therapeutic Exercise:                               minutes  SKC 10x  DKC 10x  LTR 10x  Supine hamstring stretch 10 sec 5x  Supine piriformis stretch 10 sec 5x  Supine windshield wipers 10x   Seated lumbar flexion stretch 10 sec 5x            Modalities:                                             Response to treatment: improved knowledge and understanding of condition    Education:  Educated on relevant anatomy and expected plan of care  Instructed in initial HEP including reasoning of given exercises and issued written instructions             Current Participants as of 5/8/2024    Name Type Comments Contact Kim Ingram MD Referring Provider  [phone]    Signature pending    Ruth Leos PT Physical Therapist  [phone]    Signature pending

## 2024-05-08 NOTE — PATIENT INSTRUCTIONS
Recommend Mediterranean style of eating  Continue current medications  Follow-up with Dr. Tang in 1 year  If you have any questions or cardiac concerns, please call our office at [phone].

## 2024-05-09 ENCOUNTER — PHARMACY VISIT (OUTPATIENT)
Dept: PHARMACY | Facility: CLINIC | Age: 74
End: 2024-05-09
Payer: COMMERCIAL

## 2024-05-10 ENCOUNTER — OFFICE VISIT (OUTPATIENT)
Dept: CARDIOLOGY | Facility: CLINIC | Age: 74
End: 2024-05-10
Payer: MEDICARE

## 2024-05-10 VITALS
SYSTOLIC BLOOD PRESSURE: 130 MMHG | HEART RATE: 64 BPM | WEIGHT: 230.8 LBS | OXYGEN SATURATION: 99 % | HEIGHT: 68 IN | DIASTOLIC BLOOD PRESSURE: 68 MMHG | BODY MASS INDEX: 34.98 KG/M2

## 2024-05-10 DIAGNOSIS — I35.0 AORTIC VALVE STENOSIS, ETIOLOGY OF CARDIAC VALVE DISEASE UNSPECIFIED: ICD-10-CM

## 2024-05-10 DIAGNOSIS — E78.5 DYSLIPIDEMIA: ICD-10-CM

## 2024-05-10 DIAGNOSIS — I10 BENIGN ESSENTIAL HYPERTENSION: ICD-10-CM

## 2024-05-10 DIAGNOSIS — I25.10 ASHD (ARTERIOSCLEROTIC HEART DISEASE): Primary | ICD-10-CM

## 2024-05-10 DIAGNOSIS — I25.10 ARTERIOSCLEROTIC CORONARY ARTERY DISEASE: ICD-10-CM

## 2024-05-10 PROCEDURE — 3048F LDL-C <100 MG/DL: CPT | Performed by: NURSE PRACTITIONER

## 2024-05-10 PROCEDURE — 1160F RVW MEDS BY RX/DR IN RCRD: CPT | Performed by: NURSE PRACTITIONER

## 2024-05-10 PROCEDURE — 99213 OFFICE O/P EST LOW 20 MIN: CPT | Performed by: NURSE PRACTITIONER

## 2024-05-10 PROCEDURE — 1159F MED LIST DOCD IN RCRD: CPT | Performed by: NURSE PRACTITIONER

## 2024-05-10 PROCEDURE — 3075F SYST BP GE 130 - 139MM HG: CPT | Performed by: NURSE PRACTITIONER

## 2024-05-10 PROCEDURE — 1036F TOBACCO NON-USER: CPT | Performed by: NURSE PRACTITIONER

## 2024-05-10 PROCEDURE — 1157F ADVNC CARE PLAN IN RCRD: CPT | Performed by: NURSE PRACTITIONER

## 2024-05-10 PROCEDURE — 3078F DIAST BP <80 MM HG: CPT | Performed by: NURSE PRACTITIONER

## 2024-05-10 SDOH — ECONOMIC STABILITY: FOOD INSECURITY: WITHIN THE PAST 12 MONTHS, THE FOOD YOU BOUGHT JUST DIDN'T LAST AND YOU DIDN'T HAVE MONEY TO GET MORE.: NEVER TRUE

## 2024-05-10 SDOH — ECONOMIC STABILITY: FOOD INSECURITY: WITHIN THE PAST 12 MONTHS, YOU WORRIED THAT YOUR FOOD WOULD RUN OUT BEFORE YOU GOT MONEY TO BUY MORE.: NEVER TRUE

## 2024-05-10 ASSESSMENT — ENCOUNTER SYMPTOMS: DYSPNEA ON EXERTION: 1

## 2024-05-15 ENCOUNTER — TREATMENT (OUTPATIENT)
Dept: PHYSICAL THERAPY | Facility: CLINIC | Age: 74
End: 2024-05-15
Payer: MEDICARE

## 2024-05-15 ENCOUNTER — APPOINTMENT (OUTPATIENT)
Dept: CARDIOLOGY | Facility: CLINIC | Age: 74
End: 2024-05-15
Payer: MEDICARE

## 2024-05-15 DIAGNOSIS — M99.73 CONNECTIVE TISSUE AND DISC STENOSIS OF INTERVERTEBRAL FORAMINA OF LUMBAR REGION: ICD-10-CM

## 2024-05-15 DIAGNOSIS — M47.816 LUMBAR SPONDYLOSIS: ICD-10-CM

## 2024-05-15 DIAGNOSIS — M47.816 SPONDYLOSIS OF LUMBAR SPINE: Primary | ICD-10-CM

## 2024-05-15 PROCEDURE — 97110 THERAPEUTIC EXERCISES: CPT | Mod: GP

## 2024-05-15 PROCEDURE — 97112 NEUROMUSCULAR REEDUCATION: CPT | Mod: GP

## 2024-05-17 ENCOUNTER — APPOINTMENT (OUTPATIENT)
Dept: CARDIOLOGY | Facility: CLINIC | Age: 74
End: 2024-05-17
Payer: MEDICARE

## 2024-05-22 ENCOUNTER — APPOINTMENT (OUTPATIENT)
Dept: PHYSICAL THERAPY | Facility: CLINIC | Age: 74
End: 2024-05-22
Payer: MEDICARE

## 2024-05-23 ENCOUNTER — DOCUMENTATION (OUTPATIENT)
Dept: PHYSICAL THERAPY | Facility: CLINIC | Age: 74
End: 2024-05-23
Payer: MEDICARE

## 2024-05-23 NOTE — PROGRESS NOTES
Physical Therapy                 Therapy Communication Note    Patient Name: Moises Graham  MRN: [id number]  Today's Date: 5/23/2024     Discipline: Physical Therapy    Missed Visit Reason:      Missed Time: Cancel    Comment: pt cancelled final appt. Reported doing well and last appt not needed. Discharge with no final reasmt available

## 2024-05-30 ENCOUNTER — SPECIALTY PHARMACY (OUTPATIENT)
Dept: PHARMACY | Facility: CLINIC | Age: 74
End: 2024-05-30

## 2024-05-30 PROCEDURE — RXMED WILLOW AMBULATORY MEDICATION CHARGE

## 2024-06-07 ENCOUNTER — PHARMACY VISIT (OUTPATIENT)
Dept: PHARMACY | Facility: CLINIC | Age: 74
End: 2024-06-07
Payer: COMMERCIAL

## 2024-06-10 ENCOUNTER — SPECIALTY PHARMACY (OUTPATIENT)
Dept: PHARMACY | Facility: CLINIC | Age: 74
End: 2024-06-10

## 2024-06-18 ENCOUNTER — APPOINTMENT (OUTPATIENT)
Dept: ORTHOPEDIC SURGERY | Facility: CLINIC | Age: 74
End: 2024-06-18
Payer: MEDICARE

## 2024-07-03 ENCOUNTER — LAB (OUTPATIENT)
Dept: LAB | Facility: LAB | Age: 74
End: 2024-07-03
Payer: MEDICARE

## 2024-07-03 DIAGNOSIS — C61 PROSTATE CANCER (MULTI): ICD-10-CM

## 2024-07-03 DIAGNOSIS — C61 MALIGNANT NEOPLASM OF PROSTATE (MULTI): ICD-10-CM

## 2024-07-03 LAB
ALBUMIN SERPL BCP-MCNC: 4.1 G/DL (ref 3.4–5)
ALP SERPL-CCNC: 100 U/L (ref 33–136)
ALT SERPL W P-5'-P-CCNC: 14 U/L (ref 10–52)
ANION GAP SERPL CALC-SCNC: 14 MMOL/L (ref 10–20)
AST SERPL W P-5'-P-CCNC: 24 U/L (ref 9–39)
BASOPHILS # BLD AUTO: 0.03 X10*3/UL (ref 0–0.1)
BASOPHILS NFR BLD AUTO: 0.5 %
BILIRUB SERPL-MCNC: 1.3 MG/DL (ref 0–1.2)
BUN SERPL-MCNC: 16 MG/DL (ref 6–23)
CALCIUM SERPL-MCNC: 8.7 MG/DL (ref 8.6–10.6)
CHLORIDE SERPL-SCNC: 104 MMOL/L (ref 98–107)
CO2 SERPL-SCNC: 27 MMOL/L (ref 21–32)
CREAT SERPL-MCNC: 0.83 MG/DL (ref 0.5–1.3)
EGFRCR SERPLBLD CKD-EPI 2021: >90 ML/MIN/1.73M*2
EOSINOPHIL # BLD AUTO: 0.21 X10*3/UL (ref 0–0.4)
EOSINOPHIL NFR BLD AUTO: 3.8 %
ERYTHROCYTE [DISTWIDTH] IN BLOOD BY AUTOMATED COUNT: 12.9 % (ref 11.5–14.5)
GLUCOSE SERPL-MCNC: 176 MG/DL (ref 74–99)
HCT VFR BLD AUTO: 37.9 % (ref 41–52)
HGB BLD-MCNC: 12.4 G/DL (ref 13.5–17.5)
IMM GRANULOCYTES # BLD AUTO: 0.02 X10*3/UL (ref 0–0.5)
IMM GRANULOCYTES NFR BLD AUTO: 0.4 % (ref 0–0.9)
LYMPHOCYTES # BLD AUTO: 1.25 X10*3/UL (ref 0.8–3)
LYMPHOCYTES NFR BLD AUTO: 22.6 %
MCH RBC QN AUTO: 30.2 PG (ref 26–34)
MCHC RBC AUTO-ENTMCNC: 32.7 G/DL (ref 32–36)
MCV RBC AUTO: 92 FL (ref 80–100)
MONOCYTES # BLD AUTO: 0.33 X10*3/UL (ref 0.05–0.8)
MONOCYTES NFR BLD AUTO: 6 %
NEUTROPHILS # BLD AUTO: 3.7 X10*3/UL (ref 1.6–5.5)
NEUTROPHILS NFR BLD AUTO: 66.7 %
NRBC BLD-RTO: 0 /100 WBCS (ref 0–0)
PLATELET # BLD AUTO: 156 X10*3/UL (ref 150–450)
POTASSIUM SERPL-SCNC: 3.8 MMOL/L (ref 3.5–5.3)
PROT SERPL-MCNC: 6.3 G/DL (ref 6.4–8.2)
PSA SERPL-MCNC: <0.1 NG/ML
RBC # BLD AUTO: 4.1 X10*6/UL (ref 4.5–5.9)
SODIUM SERPL-SCNC: 141 MMOL/L (ref 136–145)
TESTOST SERPL-MCNC: <30 NG/DL (ref 240–1000)
WBC # BLD AUTO: 5.5 X10*3/UL (ref 4.4–11.3)

## 2024-07-03 PROCEDURE — 80053 COMPREHEN METABOLIC PANEL: CPT

## 2024-07-03 PROCEDURE — 84153 ASSAY OF PSA TOTAL: CPT

## 2024-07-03 PROCEDURE — 84403 ASSAY OF TOTAL TESTOSTERONE: CPT

## 2024-07-03 PROCEDURE — 85025 COMPLETE CBC W/AUTO DIFF WBC: CPT

## 2024-07-03 PROCEDURE — 36415 COLL VENOUS BLD VENIPUNCTURE: CPT

## 2024-07-08 PROCEDURE — RXMED WILLOW AMBULATORY MEDICATION CHARGE

## 2024-07-09 ENCOUNTER — PHARMACY VISIT (OUTPATIENT)
Dept: PHARMACY | Facility: CLINIC | Age: 74
End: 2024-07-09
Payer: COMMERCIAL

## 2024-07-09 ENCOUNTER — TELEMEDICINE (OUTPATIENT)
Dept: HEMATOLOGY/ONCOLOGY | Facility: HOSPITAL | Age: 74
End: 2024-07-09
Payer: MEDICARE

## 2024-07-09 DIAGNOSIS — C61 PROSTATE CANCER (MULTI): Primary | ICD-10-CM

## 2024-07-09 PROCEDURE — 1159F MED LIST DOCD IN RCRD: CPT | Performed by: NURSE PRACTITIONER

## 2024-07-09 PROCEDURE — 99443 PR PHYS/QHP TELEPHONE EVALUATION 21-30 MIN: CPT | Performed by: NURSE PRACTITIONER

## 2024-07-09 PROCEDURE — 3048F LDL-C <100 MG/DL: CPT | Performed by: NURSE PRACTITIONER

## 2024-07-09 PROCEDURE — 1160F RVW MEDS BY RX/DR IN RCRD: CPT | Performed by: NURSE PRACTITIONER

## 2024-07-09 PROCEDURE — 1157F ADVNC CARE PLAN IN RCRD: CPT | Performed by: NURSE PRACTITIONER

## 2024-07-30 ENCOUNTER — APPOINTMENT (OUTPATIENT)
Dept: RADIOLOGY | Facility: CLINIC | Age: 74
End: 2024-07-30
Payer: MEDICARE

## 2024-07-30 ENCOUNTER — TELEPHONE (OUTPATIENT)
Dept: ADMISSION | Facility: HOSPITAL | Age: 74
End: 2024-07-30
Payer: MEDICARE

## 2024-07-30 DIAGNOSIS — Z79.818 ANDROGEN DEPRIVATION THERAPY: Primary | ICD-10-CM

## 2024-07-30 NOTE — TELEPHONE ENCOUNTER
Patient called and states he went to scheduled Bone Density appointment test today but it was canceled d/t not being covered by insurance.  Exam code needs to be changed - per patient.  Requesting new order for approval.

## 2024-07-31 ENCOUNTER — SPECIALTY PHARMACY (OUTPATIENT)
Dept: PHARMACY | Facility: CLINIC | Age: 74
End: 2024-07-31

## 2024-07-31 PROCEDURE — RXMED WILLOW AMBULATORY MEDICATION CHARGE

## 2024-08-06 ENCOUNTER — APPOINTMENT (OUTPATIENT)
Dept: RADIOLOGY | Facility: CLINIC | Age: 74
End: 2024-08-06
Payer: MEDICARE

## 2024-08-07 ENCOUNTER — PHARMACY VISIT (OUTPATIENT)
Dept: PHARMACY | Facility: CLINIC | Age: 74
End: 2024-08-07
Payer: COMMERCIAL

## 2024-08-12 ENCOUNTER — HOSPITAL ENCOUNTER (OUTPATIENT)
Dept: RADIOLOGY | Facility: CLINIC | Age: 74
Discharge: HOME | End: 2024-08-12
Payer: MEDICARE

## 2024-08-12 DIAGNOSIS — Z79.818 ANDROGEN DEPRIVATION THERAPY: ICD-10-CM

## 2024-08-12 PROCEDURE — 77080 DXA BONE DENSITY AXIAL: CPT

## 2024-08-12 PROCEDURE — 77080 DXA BONE DENSITY AXIAL: CPT | Performed by: STUDENT IN AN ORGANIZED HEALTH CARE EDUCATION/TRAINING PROGRAM

## 2024-08-12 ASSESSMENT — LIFESTYLE VARIABLES
CURRENT_SMOKER: N
3_OR_MORE_DRINKS_PER_DAY: N

## 2024-08-20 ENCOUNTER — PHARMACY VISIT (OUTPATIENT)
Dept: PHARMACY | Facility: CLINIC | Age: 74
End: 2024-08-20

## 2024-08-21 ENCOUNTER — SPECIALTY PHARMACY (OUTPATIENT)
Dept: PHARMACY | Facility: CLINIC | Age: 74
End: 2024-08-21

## 2024-09-04 PROCEDURE — RXMED WILLOW AMBULATORY MEDICATION CHARGE

## 2024-09-09 ENCOUNTER — PHARMACY VISIT (OUTPATIENT)
Dept: PHARMACY | Facility: CLINIC | Age: 74
End: 2024-09-09
Payer: COMMERCIAL

## 2024-09-19 ENCOUNTER — OFFICE VISIT (OUTPATIENT)
Dept: URGENT CARE | Age: 74
End: 2024-09-19
Payer: MEDICARE

## 2024-09-19 DIAGNOSIS — H10.9 BACTERIAL CONJUNCTIVITIS OF RIGHT EYE: Primary | ICD-10-CM

## 2024-09-19 RX ORDER — POLYMYXIN B SULFATE AND TRIMETHOPRIM 1; 10000 MG/ML; [USP'U]/ML
2 SOLUTION OPHTHALMIC EVERY 6 HOURS
Qty: 5 ML | Refills: 0 | Status: SHIPPED | OUTPATIENT
Start: 2024-09-19 | End: 2024-09-26

## 2024-09-19 RX ORDER — DOXYCYCLINE 100 MG/1
100 CAPSULE ORAL 2 TIMES DAILY
Qty: 20 CAPSULE | Refills: 0 | Status: SHIPPED | OUTPATIENT
Start: 2024-09-19 | End: 2024-09-29

## 2024-09-19 ASSESSMENT — ENCOUNTER SYMPTOMS
CONSTITUTIONAL NEGATIVE: 1
RESPIRATORY NEGATIVE: 1
EYE DISCHARGE: 1
PHOTOPHOBIA: 0
CARDIOVASCULAR NEGATIVE: 1
EYE PAIN: 0

## 2024-09-19 NOTE — PROGRESS NOTES
Subjective   Patient ID: Moises Graham is a 74 y.o. male. They present today with a chief complaint of Eye Problem (Right eye infection or sty, yellow infection X 1 month. STEVEN-MA).    History of Present Illness    Eye Problem  Associated symptoms: discharge    Associated symptoms: no photophobia        Patient presents to the urgent care for a chief complaint of right eye discharge and irritation over the last month, patient unsure if stye or a bacterial conjunctivitis, patient did have previous URI approximately 3 weeks ago but states that conjunctivitis was an issue prior.  No report of eye pain or change in visual acuity.  Patient states he awakes with eyelid of the right eye matted and crusted, no report of previous trauma    Past Medical History  Allergies as of 09/19/2024    (No Known Allergies)       (Not in a hospital admission)       Past Medical History:   Diagnosis Date    Personal history of malignant neoplasm of prostate     History of malignant neoplasm of prostate    Personal history of other diseases of the circulatory system     History of abnormal electrocardiography    Personal history of other diseases of the circulatory system     History of pericarditis       Past Surgical History:   Procedure Laterality Date    CT ANGIO CORONARY ART WITH HEARTFLOW IF SCORE >30%  7/8/2022    CT HEART CORONARY ANGIOGRAM 7/8/2022 [facility] ANCILLARY LEGACY        reports that he has never smoked. He has never used smokeless tobacco. He reports current alcohol use of about 4.0 standard drinks of alcohol per week. He reports that he does not use drugs.    Review of Systems  Review of Systems   Constitutional: Negative.    HENT: Negative.     Eyes:  Positive for discharge. Negative for photophobia, pain and visual disturbance.   Respiratory: Negative.     Cardiovascular: Negative.                                   Objective    There were no vitals filed for this visit.  No LMP for male patient.    Physical Exam  Vitals  and nursing note reviewed.   Constitutional:       General: He is not in acute distress.     Appearance: Normal appearance. He is not ill-appearing, toxic-appearing or diaphoretic.   Eyes:      General:         Right eye: Discharge present.         Left eye: No discharge.      Extraocular Movements: Extraocular movements intact.      Pupils: Pupils are equal, round, and reactive to light.      Comments: Upon examination of bilateral eyes no presence of orbital cellulitis, upper and lower lids of right everted no presence of foreign body no presence of chalazion or hordeolum, there is the presence of skin tag on right lower outer canthus, nontender to palpation.  Eyelids are matted and crusted also dried crusting on right inner canthus   Neurological:      Mental Status: He is alert.         Procedures    Point of Care Test & Imaging Results from this visit  No results found for this visit on 09/19/24.   No results found.    Diagnostic study results (if any) were reviewed by Myles Park PA-C.    Assessment/Plan   Allergies, medications, history, and pertinent labs/EKGs/Imaging reviewed by Myles Park PA-C.     Medical Decision Making  Due to duration of symptoms patient will be placed on doxycycline and Polytrim eyedrops.  I did advise patient if no resolution or regression of symptoms in 4 to 5 days he is to follow-up with ophthalmology information provided any change in visual acuity or any eye pain patient is to go to the emergency room.  Patient verbalized understanding agreeable to plan discharge emergent care A+O x 4 stable condition no signs of distress patient visual acuity intact    Orders and Diagnoses  Diagnoses and all orders for this visit:  Bacterial conjunctivitis of right eye  -     doxycycline (Vibramycin) 100 mg capsule; Take 1 capsule (100 mg) by mouth 2 times a day for 10 days. Take with at least 8 ounces (large glass) of water, do not lie down for 30 minutes after  -     polymyxin B  sulf-trimethoprim (Polytrim) ophthalmic solution; Administer 2 drops into the right eye every 6 hours for 7 days.      Medical Admin Record      Patient disposition: Home    Electronically signed by Myles Park PA-C  3:27 PM

## 2024-09-20 ENCOUNTER — APPOINTMENT (OUTPATIENT)
Dept: HEMATOLOGY/ONCOLOGY | Facility: HOSPITAL | Age: 74
End: 2024-09-20
Payer: MEDICARE

## 2024-09-23 ENCOUNTER — LAB (OUTPATIENT)
Dept: LAB | Facility: LAB | Age: 74
End: 2024-09-23
Payer: MEDICARE

## 2024-09-23 DIAGNOSIS — C61 PROSTATE CANCER (MULTI): ICD-10-CM

## 2024-09-23 LAB
ALBUMIN SERPL BCP-MCNC: 4.1 G/DL (ref 3.4–5)
ALP SERPL-CCNC: 97 U/L (ref 33–136)
ALT SERPL W P-5'-P-CCNC: 12 U/L (ref 10–52)
ANION GAP SERPL CALC-SCNC: 11 MMOL/L (ref 10–20)
AST SERPL W P-5'-P-CCNC: 21 U/L (ref 9–39)
BASOPHILS # BLD AUTO: 0.04 X10*3/UL (ref 0–0.1)
BASOPHILS NFR BLD AUTO: 0.6 %
BILIRUB SERPL-MCNC: 1.3 MG/DL (ref 0–1.2)
BUN SERPL-MCNC: 21 MG/DL (ref 6–23)
CALCIUM SERPL-MCNC: 8.7 MG/DL (ref 8.6–10.6)
CHLORIDE SERPL-SCNC: 107 MMOL/L (ref 98–107)
CO2 SERPL-SCNC: 30 MMOL/L (ref 21–32)
CREAT SERPL-MCNC: 0.73 MG/DL (ref 0.5–1.3)
EGFRCR SERPLBLD CKD-EPI 2021: >90 ML/MIN/1.73M*2
EOSINOPHIL # BLD AUTO: 0.23 X10*3/UL (ref 0–0.4)
EOSINOPHIL NFR BLD AUTO: 3.3 %
ERYTHROCYTE [DISTWIDTH] IN BLOOD BY AUTOMATED COUNT: 13.3 % (ref 11.5–14.5)
GLUCOSE SERPL-MCNC: 92 MG/DL (ref 74–99)
HCT VFR BLD AUTO: 37.5 % (ref 41–52)
HGB BLD-MCNC: 12.3 G/DL (ref 13.5–17.5)
IMM GRANULOCYTES # BLD AUTO: 0.01 X10*3/UL (ref 0–0.5)
IMM GRANULOCYTES NFR BLD AUTO: 0.1 % (ref 0–0.9)
LYMPHOCYTES # BLD AUTO: 2.44 X10*3/UL (ref 0.8–3)
LYMPHOCYTES NFR BLD AUTO: 35 %
MCH RBC QN AUTO: 30.4 PG (ref 26–34)
MCHC RBC AUTO-ENTMCNC: 32.8 G/DL (ref 32–36)
MCV RBC AUTO: 93 FL (ref 80–100)
MONOCYTES # BLD AUTO: 0.58 X10*3/UL (ref 0.05–0.8)
MONOCYTES NFR BLD AUTO: 8.3 %
NEUTROPHILS # BLD AUTO: 3.68 X10*3/UL (ref 1.6–5.5)
NEUTROPHILS NFR BLD AUTO: 52.7 %
NRBC BLD-RTO: 0 /100 WBCS (ref 0–0)
PLATELET # BLD AUTO: 194 X10*3/UL (ref 150–450)
POTASSIUM SERPL-SCNC: 3.9 MMOL/L (ref 3.5–5.3)
PROT SERPL-MCNC: 5.8 G/DL (ref 6.4–8.2)
PSA SERPL-MCNC: <0.1 NG/ML
RBC # BLD AUTO: 4.04 X10*6/UL (ref 4.5–5.9)
SODIUM SERPL-SCNC: 144 MMOL/L (ref 136–145)
TESTOST SERPL-MCNC: <30 NG/DL (ref 240–1000)
WBC # BLD AUTO: 7 X10*3/UL (ref 4.4–11.3)

## 2024-09-23 PROCEDURE — 84403 ASSAY OF TOTAL TESTOSTERONE: CPT

## 2024-09-23 PROCEDURE — 84153 ASSAY OF PSA TOTAL: CPT

## 2024-09-23 PROCEDURE — 85025 COMPLETE CBC W/AUTO DIFF WBC: CPT

## 2024-09-23 PROCEDURE — 80053 COMPREHEN METABOLIC PANEL: CPT

## 2024-09-23 PROCEDURE — 36415 COLL VENOUS BLD VENIPUNCTURE: CPT

## 2024-09-25 ENCOUNTER — INFUSION (OUTPATIENT)
Dept: HEMATOLOGY/ONCOLOGY | Facility: HOSPITAL | Age: 74
End: 2024-09-25
Payer: MEDICARE

## 2024-09-25 ENCOUNTER — OFFICE VISIT (OUTPATIENT)
Dept: HEMATOLOGY/ONCOLOGY | Facility: HOSPITAL | Age: 74
End: 2024-09-25
Payer: MEDICARE

## 2024-09-25 VITALS
BODY MASS INDEX: 34.83 KG/M2 | RESPIRATION RATE: 17 BRPM | OXYGEN SATURATION: 99 % | TEMPERATURE: 96.6 F | SYSTOLIC BLOOD PRESSURE: 148 MMHG | WEIGHT: 229.06 LBS | DIASTOLIC BLOOD PRESSURE: 75 MMHG | HEART RATE: 58 BPM

## 2024-09-25 DIAGNOSIS — C61 MALIGNANT NEOPLASM OF PROSTATE (MULTI): ICD-10-CM

## 2024-09-25 DIAGNOSIS — C61 PROSTATE CANCER (MULTI): ICD-10-CM

## 2024-09-25 PROCEDURE — 99215 OFFICE O/P EST HI 40 MIN: CPT | Performed by: NURSE PRACTITIONER

## 2024-09-25 PROCEDURE — G2212 PROLONG OUTPT/OFFICE VIS: HCPCS | Performed by: NURSE PRACTITIONER

## 2024-09-25 PROCEDURE — 1036F TOBACCO NON-USER: CPT | Performed by: NURSE PRACTITIONER

## 2024-09-25 PROCEDURE — 1126F AMNT PAIN NOTED NONE PRSNT: CPT | Performed by: NURSE PRACTITIONER

## 2024-09-25 PROCEDURE — 2500000004 HC RX 250 GENERAL PHARMACY W/ HCPCS (ALT 636 FOR OP/ED): Mod: JZ | Performed by: NURSE PRACTITIONER

## 2024-09-25 PROCEDURE — 3048F LDL-C <100 MG/DL: CPT | Performed by: NURSE PRACTITIONER

## 2024-09-25 PROCEDURE — 1157F ADVNC CARE PLAN IN RCRD: CPT | Performed by: NURSE PRACTITIONER

## 2024-09-25 PROCEDURE — 96402 CHEMO HORMON ANTINEOPL SQ/IM: CPT

## 2024-09-25 PROCEDURE — 3078F DIAST BP <80 MM HG: CPT | Performed by: NURSE PRACTITIONER

## 2024-09-25 PROCEDURE — 1159F MED LIST DOCD IN RCRD: CPT | Performed by: NURSE PRACTITIONER

## 2024-09-25 PROCEDURE — 3077F SYST BP >= 140 MM HG: CPT | Performed by: NURSE PRACTITIONER

## 2024-09-25 RX ORDER — EPINEPHRINE 0.3 MG/.3ML
0.3 INJECTION SUBCUTANEOUS EVERY 5 MIN PRN
Status: DISCONTINUED | OUTPATIENT
Start: 2024-09-25 | End: 2024-09-25 | Stop reason: HOSPADM

## 2024-09-25 RX ORDER — FAMOTIDINE 10 MG/ML
20 INJECTION INTRAVENOUS ONCE AS NEEDED
Status: DISCONTINUED | OUTPATIENT
Start: 2024-09-25 | End: 2024-09-25 | Stop reason: HOSPADM

## 2024-09-25 RX ORDER — EPINEPHRINE 0.3 MG/.3ML
0.3 INJECTION SUBCUTANEOUS EVERY 5 MIN PRN
OUTPATIENT
Start: 2024-09-30

## 2024-09-25 RX ORDER — FAMOTIDINE 10 MG/ML
20 INJECTION INTRAVENOUS ONCE AS NEEDED
OUTPATIENT
Start: 2024-09-30

## 2024-09-25 RX ORDER — ALBUTEROL SULFATE 0.83 MG/ML
3 SOLUTION RESPIRATORY (INHALATION) AS NEEDED
Status: DISCONTINUED | OUTPATIENT
Start: 2024-09-25 | End: 2024-09-25 | Stop reason: HOSPADM

## 2024-09-25 RX ORDER — ALBUTEROL SULFATE 0.83 MG/ML
3 SOLUTION RESPIRATORY (INHALATION) AS NEEDED
OUTPATIENT
Start: 2024-09-30

## 2024-09-25 RX ORDER — DIPHENHYDRAMINE HYDROCHLORIDE 50 MG/ML
50 INJECTION INTRAMUSCULAR; INTRAVENOUS AS NEEDED
OUTPATIENT
Start: 2024-09-30

## 2024-09-25 RX ORDER — DIPHENHYDRAMINE HYDROCHLORIDE 50 MG/ML
50 INJECTION INTRAMUSCULAR; INTRAVENOUS AS NEEDED
Status: DISCONTINUED | OUTPATIENT
Start: 2024-09-25 | End: 2024-09-25 | Stop reason: HOSPADM

## 2024-09-25 ASSESSMENT — PAIN SCALES - GENERAL: PAINLEVEL: 0-NO PAIN

## 2024-09-25 NOTE — PROGRESS NOTES
Patient ID: Moises Graham is a 74 y.o. male.  Attending Physician: Dr. Otoniel Wilson  Cancer Diagnosis:  Cancer Staging   No matching staging information was found for the patient.     Current Therapy:   ADT (Eligard 45 mg subcutaneous q24 weeks)  Abiraterone Acetate 1000 mg PO daily  Prednisone 5 mg PO daily    Subjective      Cancer History:  Oncology History   Prostate cancer (Multi)   8/18/2023 Initial Diagnosis    Malignant neoplasm of prostate (CMS/HCC)     12/13/2023 -  Chemotherapy    Abiraterone / PredniSONE, 84 Day Cycles       Prostate Cancer - [facility]  2012: RP with LND for intermediate-risk prostate cancer (iPSA/pT2c/GS7/negative margins and nodes). PSA was undetectable after surgery  2016: BCR and underwent salvage RT to prostate region, pre-RT PSA 2.39. No PSA response, serologic PD. He was started on ADT with Relugolix as part of a clinical trial, last 2018  7/2019: PSA purvi 0.6. Axumin PET without metastatic disease.  Late 2019: serologic PD and started again on ADT with Lupron. Initial PSA decline, however subsequent PSA rise, on observation. PSADT since 4-6 mos.   Summer 2021:   10/2021: PSMA PET revealed multiple lymph nodes (Pelvic/PRLNs and mediastinal LNs) without bone disease.   11/2021: Started ADT and AA/P with PSA response.  1/17/2023: PSA undetectable  4/28/2023: PSA undetectable  2023: negative genetic testing  7/2023: PSA undetectable    Other Providers:  Dr. Raghu Sotelo, PCP  Dr. Jayson Tang, cardiology  Dr. Victor Manuel Tang, urology    Interval History:  Mr. Graham presents today in follow up. He has had some progressive fatigue. Some PARK when walking up stairs. Has been keeping up with cardiology and had lungs checked out without cause. He is still able to do his normal activities with breaks. Working out with weights about 30 minutes a day and walking. His hip has been significantly improved since seeing PM&R and PT, and has been able to walk more. He does not have any new or  concerning symptoms at this time. The remainder of his ROS is otherwise negative.    HPI    Objective    BSA: 2.23 meters squared  /75   Pulse 58   Temp 35.9 °C (96.6 °F)   Resp 17   Wt 104 kg (229 lb 0.9 oz)   SpO2 99%   BMI 34.83 kg/m²     Physical Exam  PHYSICAL EXAM:   General: alert, well-dressed in NAD. Speech is fluent and coherent, words clear. Good insight. Oriented x4  Skin: warm, dry, and pink without cyanosis or nail clubbing. No rash, petechiae, or ecchymoses  HEENT: Normocephalic atraumatic. Sclera white, conjunctiva pink. EOMs intact. Hearing intact to spoken voice. No visible lesions  Respiratory: Chest expansion symmetric. No audible wheeze. Unlabored breathing.  CV: Good color  Psych: engaged, polite, appropriate conversation and eye contact.    Current Medications:    Current Outpatient Medications:     abiraterone (Zytiga) 250 mg tablet, TAKE FOUR (4) TABLETS BY MOUTH ONCE A DAY IN THE MORNING. NO FOOD OR DRINK 2 HOURS BEFORE OR 1 HOUR AFTER ADMINISTRATION., Disp: 120 tablet, Rfl: 11    amLODIPine (Norvasc) 5 mg tablet, Take 1 tablet (5 mg) by mouth once daily., Disp: 90 tablet, Rfl: 3    aspirin 81 mg EC tablet, Take 1 tablet (81 mg) by mouth once daily., Disp: , Rfl:     calcium citrate-vitamin D2 250 mg-2.5 mcg (100 unit) tablet, Take 2 tablets by mouth 2 times a day., Disp: , Rfl:     cholecalciferol (Vitamin D3) 25 MCG (1000 UT) tablet, Take 2 tablets (2,000 Units) by mouth once daily., Disp: , Rfl:     coenzyme Q-10 (Co Q-10) 10 mg capsule, Take 1 capsule (10 mg) by mouth once daily., Disp: , Rfl:     docusate sodium (Colace) 100 mg capsule, Take 1 capsule (100 mg) by mouth twice a day., Disp: , Rfl:     doxycycline (Vibramycin) 100 mg capsule, Take 1 capsule (100 mg) by mouth 2 times a day for 10 days. Take with at least 8 ounces (large glass) of water, do not lie down for 30 minutes after, Disp: 20 capsule, Rfl: 0    ketoconazole (NIZOral) 2 % cream, Apply 1 Application  topically 2 times a day as needed. Apply a thin layer to affected areas, Disp: , Rfl:     leuprolide, 6-month, (Lupron Depot) 45 mg injection, Inject 45 mg into the muscle., Disp: , Rfl:     meloxicam (Mobic) 15 mg tablet, Take 1 tablet (15 mg) by mouth if needed. Monday, Wednesday, and Friday, Disp: , Rfl:     metoprolol succinate XL (Toprol-XL) 50 mg 24 hr tablet, Take 1 tablet (50 mg) by mouth once daily., Disp: , Rfl:     multivitamin tablet, Take by mouth., Disp: , Rfl:     nitroglycerin (Nitrostat) 0.4 mg SL tablet, Take 1 tablet (0.4 mg) by mouth every 5 minutes if needed., Disp: , Rfl:     pantoprazole (ProtoNix) 40 mg EC tablet, Take 1 tablet (40 mg) by mouth once daily., Disp: , Rfl:     polyethylene glycol (Glycolax, Miralax) 17 gram/dose powder, Take 17 g by mouth if needed., Disp: , Rfl:     polymyxin B sulf-trimethoprim (Polytrim) ophthalmic solution, Administer 2 drops into the right eye every 6 hours for 7 days., Disp: 5 mL, Rfl: 0    predniSONE (Deltasone) 5 mg tablet, Take 1 tablet (5 mg) by mouth once daily., Disp: 30 tablet, Rfl: 11    rosuvastatin (Crestor) 40 mg tablet, Take 1 tablet (40 mg) by mouth once daily., Disp: 90 tablet, Rfl: 3    vitamin B complex (B Complex-Vitamin B12) tablet, Take by mouth., Disp: , Rfl:     methocarbamol (Robaxin) 500 mg tablet, 1-2 tabs every 8 hrs as needed for muscle spasm (Patient not taking: Reported on 5/10/2024), Disp: 60 tablet, Rfl: 1     Most Recent Labs:  Results for orders placed or performed in visit on 09/23/24   PSA   Result Value Ref Range    Prostate Specific AG <0.10 <=4.00 ng/mL   Testosterone   Result Value Ref Range    Testosterone <30 (L) 240 - 1,000 ng/dL   CBC and Auto Differential   Result Value Ref Range    WBC 7.0 4.4 - 11.3 x10*3/uL    nRBC 0.0 0.0 - 0.0 /100 WBCs    RBC 4.04 (L) 4.50 - 5.90 x10*6/uL    Hemoglobin 12.3 (L) 13.5 - 17.5 g/dL    Hematocrit 37.5 (L) 41.0 - 52.0 %    MCV 93 80 - 100 fL    MCH 30.4 26.0 - 34.0 pg    MCHC 32.8  32.0 - 36.0 g/dL    RDW 13.3 11.5 - 14.5 %    Platelets 194 150 - 450 x10*3/uL    Neutrophils % 52.7 40.0 - 80.0 %    Immature Granulocytes %, Automated 0.1 0.0 - 0.9 %    Lymphocytes % 35.0 13.0 - 44.0 %    Monocytes % 8.3 2.0 - 10.0 %    Eosinophils % 3.3 0.0 - 6.0 %    Basophils % 0.6 0.0 - 2.0 %    Neutrophils Absolute 3.68 1.60 - 5.50 x10*3/uL    Immature Granulocytes Absolute, Automated 0.01 0.00 - 0.50 x10*3/uL    Lymphocytes Absolute 2.44 0.80 - 3.00 x10*3/uL    Monocytes Absolute 0.58 0.05 - 0.80 x10*3/uL    Eosinophils Absolute 0.23 0.00 - 0.40 x10*3/uL    Basophils Absolute 0.04 0.00 - 0.10 x10*3/uL   Comprehensive Metabolic Panel   Result Value Ref Range    Glucose 92 74 - 99 mg/dL    Sodium 144 136 - 145 mmol/L    Potassium 3.9 3.5 - 5.3 mmol/L    Chloride 107 98 - 107 mmol/L    Bicarbonate 30 21 - 32 mmol/L    Anion Gap 11 10 - 20 mmol/L    Urea Nitrogen 21 6 - 23 mg/dL    Creatinine 0.73 0.50 - 1.30 mg/dL    eGFR >90 >60 mL/min/1.73m*2    Calcium 8.7 8.6 - 10.6 mg/dL    Albumin 4.1 3.4 - 5.0 g/dL    Alkaline Phosphatase 97 33 - 136 U/L    Total Protein 5.8 (L) 6.4 - 8.2 g/dL    AST 21 9 - 39 U/L    Bilirubin, Total 1.3 (H) 0.0 - 1.2 mg/dL    ALT 12 10 - 52 U/L      Lab Results   Component Value Date    PSA <0.10 09/23/2024    PSA <0.10 07/03/2024    PSA <0.10 04/10/2024        Performance Status:  Symptomatic; fully ambulatory    Assessment/Plan   Moises Graham is a 74 y.o. male with metastatic prostate cancer to multiple LN's,  who presents in follow up ADT and AA/P. He looks and feels overall relatively well. Labs relatively unremarkable. PSA remains undetectable.    He is tolerating meds well, though does have some fatigue. Likely multifactorial, though contribution to low T and AA very likely. Discussed options for dose reduction/holds of AA/P. He'd like to keep on these doses without changing and feels he is adapting well to this.     His DEXA shows osteopenia. This is a change from previous.  Near osteoporosis in femur neck, and with elevated fracture risk. We discussed options including supplementation/weight bearing exercise, and consideration of Prolia. He'd like to continue calcium, will increase to 1000 mg every other day given history of kidney stones, and 500 mg the other days. Will plan to recheck next summer due to difference.    # Prostate cancer  - Continue ADT, due today, again due March 2025 (will be in [place] until April so will do again in April0  - Continue AA/P, he'd like to keep present doses  - Continue to monitor PSA/T/CMP/CBC    # Fatigue  - CPM, he'd like to keep present doses  - Monitor    # Low back pain  - Continue with PM&R and PT    # Bone Health  - Continue calcium and vitamin D supplementation  - Continue regular, weight-bearing physical activity  - Last DEXA 7/2022 normal, 7/2024 with osteopenia  - Recheck 7-8/2025 due to decrease in bone mass    # Health Maintenance  - Continue with PCP and other healthcare providers  - Continue exercise, heart-healthy diet    RTC 3 months for OV with Dr. Wilson    Total time spent on this encounter was 70 minutes, which included preparation, direct time with patient, documentation, and care coordination on the day of visit.    Angelita Bean, MSN, APRN, AGNP-C, AOCNP  Associate Nurse Practitioner  [facility] [place], [facility]

## 2024-09-25 NOTE — PROGRESS NOTES
Pt arrived ambulatory for eligard injection.  Denies any new or worsening symptoms.  Tolerated injection without issue.  Discharged in stable condition.

## 2024-10-02 PROCEDURE — RXMED WILLOW AMBULATORY MEDICATION CHARGE

## 2024-10-07 ENCOUNTER — SPECIALTY PHARMACY (OUTPATIENT)
Dept: PHARMACY | Facility: CLINIC | Age: 74
End: 2024-10-07

## 2024-10-07 ENCOUNTER — PHARMACY VISIT (OUTPATIENT)
Dept: PHARMACY | Facility: CLINIC | Age: 74
End: 2024-10-07
Payer: COMMERCIAL

## 2024-10-24 ENCOUNTER — SPECIALTY PHARMACY (OUTPATIENT)
Dept: PHARMACY | Facility: CLINIC | Age: 74
End: 2024-10-24

## 2024-10-24 DIAGNOSIS — C61 MALIGNANT NEOPLASM OF PROSTATE (MULTI): ICD-10-CM

## 2024-10-24 PROCEDURE — RXMED WILLOW AMBULATORY MEDICATION CHARGE

## 2024-10-24 RX ORDER — PREDNISONE 5 MG/1
5 TABLET ORAL DAILY
Qty: 30 TABLET | Refills: 3 | Status: SHIPPED | OUTPATIENT
Start: 2024-10-24 | End: 2025-10-19

## 2024-10-24 RX ORDER — ABIRATERONE ACETATE 250 MG/1
TABLET ORAL
Qty: 120 TABLET | Refills: 3 | Status: SHIPPED | OUTPATIENT
Start: 2024-10-24 | End: 2025-10-24

## 2024-11-01 ENCOUNTER — SPECIALTY PHARMACY (OUTPATIENT)
Dept: PHARMACY | Facility: CLINIC | Age: 74
End: 2024-11-01

## 2024-11-05 ENCOUNTER — PHARMACY VISIT (OUTPATIENT)
Dept: PHARMACY | Facility: CLINIC | Age: 74
End: 2024-11-05
Payer: COMMERCIAL

## 2024-11-22 DIAGNOSIS — C61 PROSTATE CANCER (MULTI): Primary | ICD-10-CM

## 2024-11-22 RX ORDER — DIPHENHYDRAMINE HYDROCHLORIDE 50 MG/ML
50 INJECTION INTRAMUSCULAR; INTRAVENOUS AS NEEDED
OUTPATIENT
Start: 2024-12-15

## 2024-11-22 RX ORDER — ALBUTEROL SULFATE 0.83 MG/ML
3 SOLUTION RESPIRATORY (INHALATION) AS NEEDED
OUTPATIENT
Start: 2024-12-15

## 2024-11-22 RX ORDER — FAMOTIDINE 10 MG/ML
20 INJECTION INTRAVENOUS ONCE AS NEEDED
OUTPATIENT
Start: 2024-12-15

## 2024-11-22 RX ORDER — EPINEPHRINE 0.3 MG/.3ML
0.3 INJECTION SUBCUTANEOUS EVERY 5 MIN PRN
OUTPATIENT
Start: 2024-12-15

## 2024-11-29 ENCOUNTER — SPECIALTY PHARMACY (OUTPATIENT)
Dept: PHARMACY | Facility: CLINIC | Age: 74
End: 2024-11-29

## 2024-11-29 PROCEDURE — RXMED WILLOW AMBULATORY MEDICATION CHARGE

## 2024-12-03 ENCOUNTER — PHARMACY VISIT (OUTPATIENT)
Dept: PHARMACY | Facility: CLINIC | Age: 74
End: 2024-12-03
Payer: COMMERCIAL

## 2024-12-16 ENCOUNTER — LAB (OUTPATIENT)
Dept: LAB | Facility: LAB | Age: 74
End: 2024-12-16
Payer: MEDICARE

## 2024-12-16 DIAGNOSIS — C61 PROSTATE CANCER (MULTI): ICD-10-CM

## 2024-12-16 LAB
ALBUMIN SERPL BCP-MCNC: 4.3 G/DL (ref 3.4–5)
ALP SERPL-CCNC: 90 U/L (ref 33–136)
ALT SERPL W P-5'-P-CCNC: 17 U/L (ref 10–52)
ANION GAP SERPL CALC-SCNC: 12 MMOL/L (ref 10–20)
AST SERPL W P-5'-P-CCNC: 26 U/L (ref 9–39)
BASOPHILS # BLD AUTO: 0.03 X10*3/UL (ref 0–0.1)
BASOPHILS NFR BLD AUTO: 0.5 %
BILIRUB SERPL-MCNC: 1.2 MG/DL (ref 0–1.2)
BUN SERPL-MCNC: 17 MG/DL (ref 6–23)
CALCIUM SERPL-MCNC: 9.2 MG/DL (ref 8.6–10.6)
CHLORIDE SERPL-SCNC: 104 MMOL/L (ref 98–107)
CO2 SERPL-SCNC: 28 MMOL/L (ref 21–32)
CREAT SERPL-MCNC: 0.77 MG/DL (ref 0.5–1.3)
EGFRCR SERPLBLD CKD-EPI 2021: >90 ML/MIN/1.73M*2
EOSINOPHIL # BLD AUTO: 0.11 X10*3/UL (ref 0–0.4)
EOSINOPHIL NFR BLD AUTO: 1.8 %
ERYTHROCYTE [DISTWIDTH] IN BLOOD BY AUTOMATED COUNT: 12.9 % (ref 11.5–14.5)
GLUCOSE SERPL-MCNC: 213 MG/DL (ref 74–99)
HCT VFR BLD AUTO: 40.2 % (ref 41–52)
HGB BLD-MCNC: 13.3 G/DL (ref 13.5–17.5)
IMM GRANULOCYTES # BLD AUTO: 0.02 X10*3/UL (ref 0–0.5)
IMM GRANULOCYTES NFR BLD AUTO: 0.3 % (ref 0–0.9)
LYMPHOCYTES # BLD AUTO: 1.26 X10*3/UL (ref 0.8–3)
LYMPHOCYTES NFR BLD AUTO: 20.9 %
MCH RBC QN AUTO: 30.3 PG (ref 26–34)
MCHC RBC AUTO-ENTMCNC: 33.1 G/DL (ref 32–36)
MCV RBC AUTO: 92 FL (ref 80–100)
MONOCYTES # BLD AUTO: 0.26 X10*3/UL (ref 0.05–0.8)
MONOCYTES NFR BLD AUTO: 4.3 %
NEUTROPHILS # BLD AUTO: 4.36 X10*3/UL (ref 1.6–5.5)
NEUTROPHILS NFR BLD AUTO: 72.2 %
NRBC BLD-RTO: 0 /100 WBCS (ref 0–0)
PLATELET # BLD AUTO: 209 X10*3/UL (ref 150–450)
POTASSIUM SERPL-SCNC: 4.4 MMOL/L (ref 3.5–5.3)
PROT SERPL-MCNC: 6.3 G/DL (ref 6.4–8.2)
PSA SERPL-MCNC: <0.1 NG/ML
RBC # BLD AUTO: 4.39 X10*6/UL (ref 4.5–5.9)
SODIUM SERPL-SCNC: 140 MMOL/L (ref 136–145)
TESTOST SERPL-MCNC: <30 NG/DL (ref 240–1000)
WBC # BLD AUTO: 6 X10*3/UL (ref 4.4–11.3)

## 2024-12-16 PROCEDURE — 80053 COMPREHEN METABOLIC PANEL: CPT

## 2024-12-16 PROCEDURE — 85025 COMPLETE CBC W/AUTO DIFF WBC: CPT

## 2024-12-16 PROCEDURE — 84153 ASSAY OF PSA TOTAL: CPT

## 2024-12-16 PROCEDURE — 36415 COLL VENOUS BLD VENIPUNCTURE: CPT

## 2024-12-16 PROCEDURE — 84403 ASSAY OF TOTAL TESTOSTERONE: CPT

## 2024-12-19 ENCOUNTER — OFFICE VISIT (OUTPATIENT)
Dept: HEMATOLOGY/ONCOLOGY | Facility: HOSPITAL | Age: 74
End: 2024-12-19
Payer: MEDICARE

## 2024-12-19 VITALS
RESPIRATION RATE: 16 BRPM | OXYGEN SATURATION: 98 % | HEART RATE: 58 BPM | TEMPERATURE: 96.1 F | BODY MASS INDEX: 34.82 KG/M2 | WEIGHT: 229 LBS | DIASTOLIC BLOOD PRESSURE: 64 MMHG | SYSTOLIC BLOOD PRESSURE: 144 MMHG

## 2024-12-19 DIAGNOSIS — R59.1 LYMPHADENOPATHY: ICD-10-CM

## 2024-12-19 DIAGNOSIS — R79.89 LOW SERUM TESTOSTERONE LEVEL IN MALE: ICD-10-CM

## 2024-12-19 DIAGNOSIS — C61 MALIGNANT NEOPLASM OF PROSTATE (MULTI): Primary | ICD-10-CM

## 2024-12-19 DIAGNOSIS — M85.89 OSTEOPENIA OF MULTIPLE SITES: ICD-10-CM

## 2024-12-19 PROCEDURE — 1126F AMNT PAIN NOTED NONE PRSNT: CPT | Performed by: INTERNAL MEDICINE

## 2024-12-19 PROCEDURE — 1157F ADVNC CARE PLAN IN RCRD: CPT | Performed by: INTERNAL MEDICINE

## 2024-12-19 PROCEDURE — 3077F SYST BP >= 140 MM HG: CPT | Performed by: INTERNAL MEDICINE

## 2024-12-19 PROCEDURE — 3048F LDL-C <100 MG/DL: CPT | Performed by: INTERNAL MEDICINE

## 2024-12-19 PROCEDURE — 3078F DIAST BP <80 MM HG: CPT | Performed by: INTERNAL MEDICINE

## 2024-12-19 PROCEDURE — 99214 OFFICE O/P EST MOD 30 MIN: CPT | Performed by: INTERNAL MEDICINE

## 2024-12-19 RX ORDER — FAMOTIDINE 10 MG/ML
20 INJECTION INTRAVENOUS ONCE AS NEEDED
OUTPATIENT
Start: 2025-04-16

## 2024-12-19 RX ORDER — DIPHENHYDRAMINE HYDROCHLORIDE 50 MG/ML
50 INJECTION INTRAMUSCULAR; INTRAVENOUS AS NEEDED
OUTPATIENT
Start: 2025-04-16

## 2024-12-19 RX ORDER — ALBUTEROL SULFATE 0.83 MG/ML
3 SOLUTION RESPIRATORY (INHALATION) AS NEEDED
OUTPATIENT
Start: 2025-04-16

## 2024-12-19 RX ORDER — EPINEPHRINE 0.3 MG/.3ML
0.3 INJECTION SUBCUTANEOUS EVERY 5 MIN PRN
OUTPATIENT
Start: 2025-04-16

## 2024-12-19 ASSESSMENT — PAIN SCALES - GENERAL: PAINLEVEL_OUTOF10: 0-NO PAIN

## 2024-12-19 NOTE — PROGRESS NOTES
Medical Oncology Out Patient Clinic Note    Patient's Name: Moises Graham  MRN: [id number]  Date of Service: 12/19/2024  In- Person Visit: YES      Reason for Visit: FU    HPI: 75 yo male known to me with Met CSPC on AA/P with serologic response and minimal AEs  No new issues and good overall QOL    Updated PMHx  None    Review of Systems  13 point review negative    Physical Exam:  /64 (BP Location: Left arm, Patient Position: Sitting, BP Cuff Size: Adult)   Pulse 58   Temp 35.6 °C (96.1 °F) (Temporal)   Resp 16   Wt 104 kg (229 lb)   SpO2 98%   BMI 34.82 kg/m²   ECOG Performance Status: 0  HEENT: Normal  ENT: Normal  CV: NA  Pulmonary: NA  GI: NA  : NA  Extremities: No Edema   Neurologic: Normal  Skin: Normal skin turgor  Psych: Appropriate mood      LABS:  Lab Results   Component Value Date    PSA <0.10 12/16/2024    PSA <0.10 09/23/2024    PSA <0.10 07/03/2024     Lab Results   Component Value Date    WBC 6.0 12/16/2024    HGB 13.3 (L) 12/16/2024    HCT 40.2 (L) 12/16/2024    MCV 92 12/16/2024     12/16/2024     Lab Results   Component Value Date    GLUCOSE 213 (H) 12/16/2024    CALCIUM 9.2 12/16/2024     12/16/2024    K 4.4 12/16/2024    CO2 28 12/16/2024     12/16/2024    BUN 17 12/16/2024    CREATININE 0.77 12/16/2024     Lab Results   Component Value Date    TESTOSTERONE <30 (L) 12/16/2024     Lab Results   Component Value Date    ALT 17 12/16/2024    AST 26 12/16/2024    ALKPHOS 90 12/16/2024    BILITOT 1.2 12/16/2024       IMAGING:  NA  Study Result    Narrative & Impression   Interpreted By:  Cain Bhatt,   STUDY:  DEXA BONE DENSITY8/12/2024 2:06 pm      INDICATION:  Follow-up.      COMPARISON:  DEXA scan 07/29/2022.      ACCESSION NUMBER(S):  [id number]      ORDERING CLINICIAN:  WILLY DIAS      TECHNIQUE:  DEXA BONE DENSITY      FINDINGS:  SPINE L1-L4  Bone Mineral Density: 1.548  T-Score 2.7  Z-Score 3.4  Classification:  Normal      LEFT FEMUR -TOTAL  Bone  Mineral Density: 0.989  T-Score -0.8   Z-Score  0.1  Classification:  Normal      LEFT FEMUR -NECK  Bone Mineral Density: 0.796  T-Score -2.1  Z-Score -0.7  Classification:  Osteopenia          World Health Organization (WHO) criteria for post-menopausal,   Women:  Normal:         T-score at or above -1 SD  Osteopenia:   T-score between -1 and -2.5 SD  Osteoporosis: T-score at or below -2.5 SD          10-year Fracture Risk:  Major Osteoporotic Fracture  24.0%  Hip Fracture                       17.3%      This exam was performed at [facility] on a [facility] Dexa Unit.      IMPRESSION:  DEXA:  According to World Health Organization criteria,  classification is low bone mass (osteopenia)      Followup recommended in two years or sooner as clinically warranted.      All images and detailed analysis are available on the  Radiology  PACS.      Signed by: Cain Bhatt 8/13/2024 5:45 PM  Dictation workstation:   [id number]     GENOMICS:  NA    A/P: Met CSPC  Feeling well and denies any new symptoms  Serologic response remains  Continue on therapy and see us in 3 months  Discussed Denosumab every 6 months for bone loss prevention- will start Denosumab in April    Discussed importance of a healthier diet - offered to see Nutritional Services; Also discussed the importance of daily regular exercise (aerobic and resistance differences noted)  Offered to see Supportive Services if needed as well as integrative Oncology and Psychosocial Support    Otoniel Wilson MD, FACP  Chief, Solid Tumor Oncology Division   Medical Oncology  Professor of Medicine and Urology  /[facility]  [facility]

## 2024-12-27 ENCOUNTER — SPECIALTY PHARMACY (OUTPATIENT)
Dept: PHARMACY | Facility: CLINIC | Age: 74
End: 2024-12-27

## 2024-12-27 PROCEDURE — RXMED WILLOW AMBULATORY MEDICATION CHARGE

## 2024-12-28 DIAGNOSIS — E78.00 HYPERCHOLESTEROLEMIA: ICD-10-CM

## 2025-01-03 ENCOUNTER — PHARMACY VISIT (OUTPATIENT)
Dept: PHARMACY | Facility: CLINIC | Age: 75
End: 2025-01-03
Payer: COMMERCIAL

## 2025-01-03 RX ORDER — ROSUVASTATIN CALCIUM 40 MG/1
40 TABLET, COATED ORAL DAILY
Qty: 90 TABLET | Refills: 1 | Status: SHIPPED | OUTPATIENT
Start: 2025-01-03

## 2025-01-14 ENCOUNTER — PATIENT MESSAGE (OUTPATIENT)
Dept: HEMATOLOGY/ONCOLOGY | Facility: HOSPITAL | Age: 75
End: 2025-01-14
Payer: MEDICARE

## 2025-01-14 DIAGNOSIS — C61 MALIGNANT NEOPLASM OF PROSTATE (MULTI): ICD-10-CM

## 2025-01-16 DIAGNOSIS — C61 PROSTATE CANCER (MULTI): Primary | ICD-10-CM

## 2025-01-17 DIAGNOSIS — C61 MALIGNANT NEOPLASM OF PROSTATE (MULTI): Primary | ICD-10-CM

## 2025-01-17 NOTE — PROGRESS NOTES
"Per note 12/19/24:   \"A/P: Met CSPC  Feeling well and denies any new symptoms  Serologic response remains  Continue on therapy and see us in 3 months  Discussed Denosumab every 6 months for bone loss prevention- will start Denosumab in April\"    Order for Prolia is already in his chart. Follow up is due per Dr. Wilson in 3 months from last visit. Appointment request placed to see Angelita on around 3/19/25, will need lab orders placed.    Dr. Wilson/Angelita - can you please place PSA lab orders for this patient, for 3/19/25?   Thanks,  Sandrita MARCUS RN  "

## 2025-01-29 ENCOUNTER — SPECIALTY PHARMACY (OUTPATIENT)
Dept: PHARMACY | Facility: CLINIC | Age: 75
End: 2025-01-29

## 2025-01-29 ENCOUNTER — PHARMACY VISIT (OUTPATIENT)
Dept: PHARMACY | Facility: CLINIC | Age: 75
End: 2025-01-29
Payer: COMMERCIAL

## 2025-01-29 PROCEDURE — RXMED WILLOW AMBULATORY MEDICATION CHARGE

## 2025-02-07 DIAGNOSIS — C61 MALIGNANT NEOPLASM OF PROSTATE (MULTI): ICD-10-CM

## 2025-02-07 RX ORDER — PREDNISONE 5 MG/1
5 TABLET ORAL DAILY
Qty: 30 TABLET | Refills: 3 | Status: SHIPPED | OUTPATIENT
Start: 2025-02-07 | End: 2026-02-02

## 2025-02-07 RX ORDER — ABIRATERONE ACETATE 250 MG/1
TABLET ORAL
Qty: 120 TABLET | Refills: 3 | Status: SHIPPED | OUTPATIENT
Start: 2025-02-07 | End: 2026-02-07

## 2025-02-20 PROCEDURE — RXMED WILLOW AMBULATORY MEDICATION CHARGE

## 2025-02-21 ENCOUNTER — SPECIALTY PHARMACY (OUTPATIENT)
Dept: PHARMACY | Facility: HOSPITAL | Age: 75
End: 2025-02-21
Payer: MEDICARE

## 2025-02-21 DIAGNOSIS — C61 PROSTATE CANCER (MULTI): Primary | ICD-10-CM

## 2025-02-21 NOTE — PROGRESS NOTES
Called patient to perform medication reassessment/outreach on abiraterone/prednisone. Patient has been on emeli for a long term - tolerating well without any side effects. Patient does report some concern for (steroid induced) increased blood glucose. Patient counseled on lifestyle changes (dietary/exercise) in detail to help reduce BG. Patient reports PCP is following.     No other issues. All other questions addressed.       02/21/25 at 11:22 AM - Bebeto GarrisonD  PGY-2 Oncology Pharmacy Resident    Attestation Statements   I have reviewed the pharmacy resident's note and verified the findings in the note as written with additions or exceptions as stated in the body of this note.    Dung Gonzalez, PharmD, CSP  Clinical Pharm Specialist -  Oncology  [facility]  Phone #: [phone]  Fax #: [phone]  Email: [email or web address]

## 2025-02-25 ENCOUNTER — SPECIALTY PHARMACY (OUTPATIENT)
Dept: PHARMACY | Facility: CLINIC | Age: 75
End: 2025-02-25

## 2025-02-27 ENCOUNTER — PHARMACY VISIT (OUTPATIENT)
Dept: PHARMACY | Facility: CLINIC | Age: 75
End: 2025-02-27
Payer: COMMERCIAL

## 2025-03-19 ENCOUNTER — APPOINTMENT (OUTPATIENT)
Dept: HEMATOLOGY/ONCOLOGY | Facility: HOSPITAL | Age: 75
End: 2025-03-19
Payer: MEDICARE

## 2025-03-25 ENCOUNTER — SPECIALTY PHARMACY (OUTPATIENT)
Dept: PHARMACY | Facility: CLINIC | Age: 75
End: 2025-03-25

## 2025-03-25 PROCEDURE — RXMED WILLOW AMBULATORY MEDICATION CHARGE

## 2025-03-26 ENCOUNTER — PHARMACY VISIT (OUTPATIENT)
Dept: PHARMACY | Facility: CLINIC | Age: 75
End: 2025-03-26
Payer: COMMERCIAL

## 2025-04-04 DIAGNOSIS — I10 BENIGN ESSENTIAL HTN: ICD-10-CM

## 2025-04-04 RX ORDER — AMLODIPINE BESYLATE 5 MG/1
5 TABLET ORAL DAILY
Qty: 90 TABLET | Refills: 3 | Status: SHIPPED | OUTPATIENT
Start: 2025-04-04

## 2025-04-09 ENCOUNTER — LAB (OUTPATIENT)
Dept: LAB | Facility: HOSPITAL | Age: 75
End: 2025-04-09
Payer: MEDICARE

## 2025-04-09 DIAGNOSIS — C61 MALIGNANT NEOPLASM OF PROSTATE (MULTI): ICD-10-CM

## 2025-04-09 DIAGNOSIS — C61 PROSTATE CANCER (MULTI): ICD-10-CM

## 2025-04-09 LAB
ALBUMIN SERPL BCP-MCNC: 4.4 G/DL (ref 3.4–5)
ALP SERPL-CCNC: 88 U/L (ref 33–136)
ALT SERPL W P-5'-P-CCNC: 15 U/L (ref 10–52)
ANION GAP SERPL CALC-SCNC: 14 MMOL/L (ref 10–20)
AST SERPL W P-5'-P-CCNC: 24 U/L (ref 9–39)
BASOPHILS # BLD AUTO: 0.04 X10*3/UL (ref 0–0.1)
BASOPHILS NFR BLD AUTO: 0.6 %
BILIRUB SERPL-MCNC: 1.6 MG/DL (ref 0–1.2)
BUN SERPL-MCNC: 16 MG/DL (ref 6–23)
CALCIUM SERPL-MCNC: 9 MG/DL (ref 8.6–10.6)
CHLORIDE SERPL-SCNC: 104 MMOL/L (ref 98–107)
CHOLEST SERPL-MCNC: 149 MG/DL
CHOLEST/HDLC SERPL: 2.6 (CALC)
CO2 SERPL-SCNC: 29 MMOL/L (ref 21–32)
CREAT SERPL-MCNC: 0.85 MG/DL (ref 0.5–1.3)
EGFRCR SERPLBLD CKD-EPI 2021: 90 ML/MIN/1.73M*2
EOSINOPHIL # BLD AUTO: 0.12 X10*3/UL (ref 0–0.4)
EOSINOPHIL NFR BLD AUTO: 1.9 %
ERYTHROCYTE [DISTWIDTH] IN BLOOD BY AUTOMATED COUNT: 13.1 % (ref 11.5–14.5)
GLUCOSE SERPL-MCNC: 130 MG/DL (ref 74–99)
HCT VFR BLD AUTO: 39.6 % (ref 41–52)
HDLC SERPL-MCNC: 58 MG/DL
HGB BLD-MCNC: 13.2 G/DL (ref 13.5–17.5)
IMM GRANULOCYTES # BLD AUTO: 0.02 X10*3/UL (ref 0–0.5)
IMM GRANULOCYTES NFR BLD AUTO: 0.3 % (ref 0–0.9)
LDLC SERPL CALC-MCNC: 73 MG/DL (CALC)
LYMPHOCYTES # BLD AUTO: 1.84 X10*3/UL (ref 0.8–3)
LYMPHOCYTES NFR BLD AUTO: 28.9 %
MCH RBC QN AUTO: 30.5 PG (ref 26–34)
MCHC RBC AUTO-ENTMCNC: 33.3 G/DL (ref 32–36)
MCV RBC AUTO: 92 FL (ref 80–100)
MONOCYTES # BLD AUTO: 0.38 X10*3/UL (ref 0.05–0.8)
MONOCYTES NFR BLD AUTO: 6 %
NEUTROPHILS # BLD AUTO: 3.97 X10*3/UL (ref 1.6–5.5)
NEUTROPHILS NFR BLD AUTO: 62.3 %
NONHDLC SERPL-MCNC: 91 MG/DL (CALC)
NRBC BLD-RTO: 0 /100 WBCS (ref 0–0)
PLATELET # BLD AUTO: 197 X10*3/UL (ref 150–450)
POTASSIUM SERPL-SCNC: 4.3 MMOL/L (ref 3.5–5.3)
PROT SERPL-MCNC: 6.5 G/DL (ref 6.4–8.2)
PSA SERPL-MCNC: <0.1 NG/ML
PSA SERPL-MCNC: <0.1 NG/ML
RBC # BLD AUTO: 4.33 X10*6/UL (ref 4.5–5.9)
SODIUM SERPL-SCNC: 143 MMOL/L (ref 136–145)
TRIGL SERPL-MCNC: 97 MG/DL
WBC # BLD AUTO: 6.4 X10*3/UL (ref 4.4–11.3)

## 2025-04-09 PROCEDURE — 84153 ASSAY OF PSA TOTAL: CPT

## 2025-04-09 PROCEDURE — 85025 COMPLETE CBC W/AUTO DIFF WBC: CPT

## 2025-04-09 PROCEDURE — 80053 COMPREHEN METABOLIC PANEL: CPT

## 2025-04-14 NOTE — PROGRESS NOTES
Patient ID: Moises Graham is a 75 y.o. male.  Attending Physician: Dr. Otoniel Wilson  Cancer Diagnosis:  Cancer Staging   No matching staging information was found for the patient.     Current Therapy:   ADT (Eligard 45 mg subcutaneous q24 weeks)  Abiraterone Acetate 1000 mg PO daily  Prednisone 5 mg PO daily    Subjective      Cancer History:  Oncology History   Prostate cancer (Multi)   8/18/2023 Initial Diagnosis    Malignant neoplasm of prostate (CMS/HCC)     12/13/2023 -  Chemotherapy    Abiraterone / PredniSONE, 84 Day Cycles       Prostate Cancer - [facility]  2012: RP with LND for intermediate-risk prostate cancer (iPSA/pT2c/GS7/negative margins and nodes). PSA was undetectable after surgery  2016: BCR and underwent salvage RT to prostate region, pre-RT PSA 2.39. No PSA response, serologic PD. He was started on ADT with Relugolix as part of a clinical trial, last 2018  7/2019: PSA purvi 0.6. Axumin PET without metastatic disease.  Late 2019: serologic PD and started again on ADT with Lupron. Initial PSA decline, however subsequent PSA rise, on observation. PSADT since 4-6 mos.   Summer 2021:   10/2021: PSMA PET revealed multiple lymph nodes (Pelvic/PRLNs and mediastinal LNs) without bone disease.   11/2021: Started ADT and AA/P with PSA response.  1/17/2023: PSA undetectable  4/28/2023: PSA undetectable  2023: negative genetic testing  7/2023: PSA undetectable    Other Providers:  Dr. Raghu Sotelo, PCP  Dr. Jayson Tang, cardiology  Dr. Victor Manuel Tang, urology    Interval History:  Mr. Graham presents today in follow up. He continues to have progressive fatigue. Still some right hip pain. Has gotten worse lately, hard to put on socks. Limping a bit as well. This may be worsening fatigue, but fatigue is affecting daily life. Workup with cards as well which has been OK so far. BG levels have gone up a bit, A1c with prediabetes. PCP concerning for SIH. No other new or concerning symptoms. The remainder of his  ROS is otherwise negative.  HPI    Objective    BSA: There is no height or weight on file to calculate BSA.  There were no vitals taken for this visit.    Physical Exam  PHYSICAL EXAM:   General: alert, well-dressed in NAD. Speech is fluent and coherent, words clear. Good insight.  Skin: warm, dry, and pink without cyanosis or nail clubbing. No rash, petechiae, or ecchymoses.  HEENT: Normocephalic atraumatic. Sclera white, conjunctiva pink. EOMs intact. Hearing intact to spoken voice. No visible lesions  Respiratory: Chest expansion symmetric. No audible wheeze. Unlabored breathing.  CV: Good color  Psych: engaged, polite, appropriate conversation and eye contact.    Current Medications:    Current Outpatient Medications:     abiraterone (Zytiga) 250 mg tablet, TAKE FOUR (4) TABLETS BY MOUTH ONCE A DAY IN THE MORNING. NO FOOD OR DRINK 2 HOURS BEFORE OR 1 HOUR AFTER ADMINISTRATION., Disp: 120 tablet, Rfl: 3    amLODIPine (Norvasc) 5 mg tablet, TAKE 1 TABLET(5 MG) BY MOUTH DAILY, Disp: 90 tablet, Rfl: 3    aspirin 81 mg EC tablet, Take 1 tablet (81 mg) by mouth once daily., Disp: , Rfl:     calcium citrate-vitamin D2 250 mg-2.5 mcg (100 unit) tablet, Take 2 tablets by mouth 2 times a day., Disp: , Rfl:     cholecalciferol (Vitamin D3) 25 MCG (1000 UT) tablet, Take 2 tablets (2,000 Units) by mouth once daily., Disp: , Rfl:     coenzyme Q-10 (Co Q-10) 10 mg capsule, Take 1 capsule (10 mg) by mouth once daily., Disp: , Rfl:     docusate sodium (Colace) 100 mg capsule, Take 1 capsule (100 mg) by mouth twice a day., Disp: , Rfl:     ketoconazole (NIZOral) 2 % cream, Apply 1 Application topically 2 times a day as needed. Apply a thin layer to affected areas, Disp: , Rfl:     leuprolide, 6-month, (Lupron Depot) 45 mg injection, Inject 45 mg into the muscle., Disp: , Rfl:     meloxicam (Mobic) 15 mg tablet, Take 1 tablet (15 mg) by mouth if needed. Monday, Wednesday, and Friday, Disp: , Rfl:     metoprolol succinate XL  (Toprol-XL) 50 mg 24 hr tablet, Take 1 tablet (50 mg) by mouth once daily., Disp: , Rfl:     multivitamin tablet, Take by mouth., Disp: , Rfl:     nitroglycerin (Nitrostat) 0.4 mg SL tablet, Take 1 tablet (0.4 mg) by mouth every 5 minutes if needed., Disp: , Rfl:     pantoprazole (ProtoNix) 40 mg EC tablet, Take 1 tablet (40 mg) by mouth once daily., Disp: , Rfl:     polyethylene glycol (Glycolax, Miralax) 17 gram/dose powder, Take 17 g by mouth if needed., Disp: , Rfl:     predniSONE (Deltasone) 5 mg tablet, Take 1 tablet (5 mg) by mouth once daily., Disp: 30 tablet, Rfl: 3    rosuvastatin (Crestor) 40 mg tablet, TAKE 1 TABLET(40 MG) BY MOUTH DAILY, Disp: 90 tablet, Rfl: 1    vitamin B complex (B Complex-Vitamin B12) tablet, Take by mouth., Disp: , Rfl:      Most Recent Labs:  Results for orders placed or performed in visit on 04/09/25   Lipid Panel    Collection Time: 04/09/25  8:16 AM   Result Value Ref Range    CHOLESTEROL, TOTAL 149 <200 mg/dL    HDL CHOLESTEROL 58 > OR = 40 mg/dL    TRIGLYCERIDES 97 <150 mg/dL    LDL-CHOLESTEROL 73 mg/dL (calc)    CHOL/HDLC RATIO 2.6 <5.0 (calc)    NON HDL CHOLESTEROL 91 <130 mg/dL (calc)     *Note: Due to a large number of results and/or encounters for the requested time period, some results have not been displayed. A complete set of results can be found in Results Review.      Lab Results   Component Value Date    PSA <0.10 04/09/2025    PSA <0.10 04/09/2025    PSA <0.10 12/16/2024        Performance Status:  Symptomatic; fully ambulatory    Assessment/Plan   Moises Graham is a 75 y.o. male with metastatic prostate cancer to multiple LN's,  who presents in follow up ADT and AA/P. He looks and feels overall relatively well. Labs relatively unremarkable. PSA remains undetectable.    He is tolerating meds well, though does have fatigue. Likely multifactorial, though contribution to low T and AA very likely. We again discussed options with abiraterone. He is agreeable to hold  x 2 weeks and consider dose reduction.    He will start Prolia today. Reviewed risks and benefits with this. He follows with dentist regularly.    # Prostate cancer  - Continue ADT, due today, again due July 2025  - HOLD AA/P due to fatigue, consider dose reduction  - Continue to monitor PSA/T/CMP/CBC    # Fatigue  - HOLD AA/P as above  - Monitor    # Hip pain  - Referral to ortho  - Xray ordered today    # Bone Health  - Continue calcium and vitamin D supplementation  - Continue regular, weight-bearing physical activity  -Start Prolia today    # Health Maintenance  - Continue with PCP and other healthcare providers  - Continue exercise, heart-healthy diet    RTC 2 weeks for virtual check in after hold of AA/P    Total time spent on this encounter was 65 minutes, which included preparation, direct time with patient, documentation, and care coordination on the day of visit.    Angelita Bean, MSN, APRN, AGNP-C, AOCNP  Associate Nurse Practitioner  [facility], [facility]

## 2025-04-16 ENCOUNTER — OFFICE VISIT (OUTPATIENT)
Dept: HEMATOLOGY/ONCOLOGY | Facility: HOSPITAL | Age: 75
End: 2025-04-16
Payer: MEDICARE

## 2025-04-16 ENCOUNTER — APPOINTMENT (OUTPATIENT)
Dept: HEMATOLOGY/ONCOLOGY | Facility: HOSPITAL | Age: 75
End: 2025-04-16
Payer: MEDICARE

## 2025-04-16 ENCOUNTER — INFUSION (OUTPATIENT)
Dept: HEMATOLOGY/ONCOLOGY | Facility: HOSPITAL | Age: 75
End: 2025-04-16
Payer: MEDICARE

## 2025-04-16 ENCOUNTER — APPOINTMENT (OUTPATIENT)
Dept: HEMATOLOGY/ONCOLOGY | Facility: CLINIC | Age: 75
End: 2025-04-16
Payer: MEDICARE

## 2025-04-16 VITALS
SYSTOLIC BLOOD PRESSURE: 136 MMHG | HEART RATE: 64 BPM | DIASTOLIC BLOOD PRESSURE: 57 MMHG | BODY MASS INDEX: 36.05 KG/M2 | TEMPERATURE: 95.2 F | WEIGHT: 237.1 LBS | RESPIRATION RATE: 19 BRPM | OXYGEN SATURATION: 97 %

## 2025-04-16 DIAGNOSIS — M85.89 OSTEOPENIA OF MULTIPLE SITES: ICD-10-CM

## 2025-04-16 DIAGNOSIS — M25.551 RIGHT HIP PAIN: Primary | ICD-10-CM

## 2025-04-16 DIAGNOSIS — C61 PROSTATE CANCER (MULTI): ICD-10-CM

## 2025-04-16 DIAGNOSIS — C61 MALIGNANT NEOPLASM OF PROSTATE (MULTI): ICD-10-CM

## 2025-04-16 PROCEDURE — 96402 CHEMO HORMON ANTINEOPL SQ/IM: CPT

## 2025-04-16 PROCEDURE — 1036F TOBACCO NON-USER: CPT | Performed by: NURSE PRACTITIONER

## 2025-04-16 PROCEDURE — 1159F MED LIST DOCD IN RCRD: CPT | Performed by: NURSE PRACTITIONER

## 2025-04-16 PROCEDURE — 1125F AMNT PAIN NOTED PAIN PRSNT: CPT | Performed by: NURSE PRACTITIONER

## 2025-04-16 PROCEDURE — 99215 OFFICE O/P EST HI 40 MIN: CPT | Performed by: NURSE PRACTITIONER

## 2025-04-16 PROCEDURE — 3078F DIAST BP <80 MM HG: CPT | Performed by: NURSE PRACTITIONER

## 2025-04-16 PROCEDURE — 96372 THER/PROPH/DIAG INJ SC/IM: CPT

## 2025-04-16 PROCEDURE — 1157F ADVNC CARE PLAN IN RCRD: CPT | Performed by: NURSE PRACTITIONER

## 2025-04-16 PROCEDURE — 2500000004 HC RX 250 GENERAL PHARMACY W/ HCPCS (ALT 636 FOR OP/ED): Mod: JZ,TB | Performed by: INTERNAL MEDICINE

## 2025-04-16 PROCEDURE — 2500000004 HC RX 250 GENERAL PHARMACY W/ HCPCS (ALT 636 FOR OP/ED): Mod: JZ,TB | Performed by: NURSE PRACTITIONER

## 2025-04-16 PROCEDURE — 3075F SYST BP GE 130 - 139MM HG: CPT | Performed by: NURSE PRACTITIONER

## 2025-04-16 RX ORDER — EPINEPHRINE 0.3 MG/.3ML
0.3 INJECTION SUBCUTANEOUS EVERY 5 MIN PRN
OUTPATIENT
Start: 2025-10-13

## 2025-04-16 RX ORDER — DIPHENHYDRAMINE HYDROCHLORIDE 50 MG/ML
50 INJECTION, SOLUTION INTRAMUSCULAR; INTRAVENOUS AS NEEDED
OUTPATIENT
Start: 2025-06-04

## 2025-04-16 RX ORDER — DIPHENHYDRAMINE HYDROCHLORIDE 50 MG/ML
50 INJECTION, SOLUTION INTRAMUSCULAR; INTRAVENOUS AS NEEDED
OUTPATIENT
Start: 2025-10-13

## 2025-04-16 RX ORDER — FAMOTIDINE 10 MG/ML
20 INJECTION, SOLUTION INTRAVENOUS ONCE AS NEEDED
OUTPATIENT
Start: 2025-10-13

## 2025-04-16 RX ORDER — EPINEPHRINE 0.3 MG/.3ML
0.3 INJECTION SUBCUTANEOUS EVERY 5 MIN PRN
OUTPATIENT
Start: 2025-06-04

## 2025-04-16 RX ORDER — ALBUTEROL SULFATE 0.83 MG/ML
3 SOLUTION RESPIRATORY (INHALATION) AS NEEDED
OUTPATIENT
Start: 2025-06-04

## 2025-04-16 RX ORDER — ALBUTEROL SULFATE 0.83 MG/ML
3 SOLUTION RESPIRATORY (INHALATION) AS NEEDED
OUTPATIENT
Start: 2025-10-13

## 2025-04-16 RX ORDER — FAMOTIDINE 10 MG/ML
20 INJECTION, SOLUTION INTRAVENOUS ONCE AS NEEDED
OUTPATIENT
Start: 2025-06-04

## 2025-04-16 RX ADMIN — LEUPROLIDE ACETATE 45 MG: 45 INJECTION, SUSPENSION, EXTENDED RELEASE SUBCUTANEOUS at 10:52

## 2025-04-16 RX ADMIN — DENOSUMAB 60 MG: 60 INJECTION SUBCUTANEOUS at 10:52

## 2025-04-16 ASSESSMENT — PAIN SCALES - GENERAL: PAINLEVEL_OUTOF10: 2

## 2025-04-16 NOTE — PROGRESS NOTES
Patient presents to infusion appointment from clinic in stable condition. Eligard and prolia injections tolerated without incident. Discharged in stable condition.

## 2025-04-17 ENCOUNTER — HOSPITAL ENCOUNTER (OUTPATIENT)
Dept: RADIOLOGY | Facility: CLINIC | Age: 75
Discharge: HOME | End: 2025-04-17
Payer: MEDICARE

## 2025-04-17 DIAGNOSIS — M25.551 RIGHT HIP PAIN: ICD-10-CM

## 2025-04-17 PROCEDURE — 73502 X-RAY EXAM HIP UNI 2-3 VIEWS: CPT | Mod: RIGHT SIDE | Performed by: RADIOLOGY

## 2025-04-17 PROCEDURE — 73502 X-RAY EXAM HIP UNI 2-3 VIEWS: CPT | Mod: RT

## 2025-04-22 ENCOUNTER — APPOINTMENT (OUTPATIENT)
Dept: ORTHOPEDIC SURGERY | Facility: CLINIC | Age: 75
End: 2025-04-22
Payer: MEDICARE

## 2025-04-22 DIAGNOSIS — C61 MALIGNANT NEOPLASM OF PROSTATE (MULTI): ICD-10-CM

## 2025-04-22 DIAGNOSIS — M25.551 RIGHT HIP PAIN: ICD-10-CM

## 2025-04-22 PROCEDURE — 1157F ADVNC CARE PLAN IN RCRD: CPT | Performed by: ORTHOPAEDIC SURGERY

## 2025-04-22 PROCEDURE — 99203 OFFICE O/P NEW LOW 30 MIN: CPT | Performed by: ORTHOPAEDIC SURGERY

## 2025-04-22 PROCEDURE — 1159F MED LIST DOCD IN RCRD: CPT | Performed by: ORTHOPAEDIC SURGERY

## 2025-04-22 NOTE — PROGRESS NOTES
Chief Complaint   Chief Complaint   Patient presents with    Right Hip - Pain         HPI:      Moises Graham is a pleasant 75 y.o. year-old male who is seen today for right hip and groin pain and difficulty putting on his socks history of malignancy last scan was 4 years ago  Meloxicam is somewhat helpful he does try to walk for exercise with his wife    Review of Systems  All other body systems have been reviewed and are negative for complaint.  See intake sheet was reviewed and scanned in the chart  There were no vitals filed for this visit.    Medical History[1]  Problem List[2]    Medication Documentation Review Audit       Reviewed by Collins Woods MA (Medical Assistant) on 04/22/25 at 1424      Medication Order Taking? Sig Documenting Provider Last Dose Status   abiraterone (Zytiga) 250 mg tablet [id number]  TAKE FOUR (4) TABLETS BY MOUTH ONCE A DAY IN THE MORNING. NO FOOD OR DRINK 2 HOURS BEFORE OR 1 HOUR AFTER ADMINISTRATION. Angelita Bean, APRN-CNP  Active   amLODIPine (Norvasc) 5 mg tablet [id number]  TAKE 1 TABLET(5 MG) BY MOUTH DAILY Nancy Cruz, APRN-CNP  Active   aspirin 81 mg EC tablet [id number] No Take 1 tablet (81 mg) by mouth once daily. Historical Provider, MD Taking Active   calcium citrate-vitamin D2 250 mg-2.5 mcg (100 unit) tablet [id number] No Take 2 tablets by mouth 2 times a day. Historical Provider, MD Taking Active   cholecalciferol (Vitamin D3) 25 MCG (1000 UT) tablet [id number] No Take 2 tablets (2,000 Units) by mouth once daily. Historical Provider, MD Taking Active   coenzyme Q-10 (Co Q-10) 10 mg capsule [id number] No Take 1 capsule (10 mg) by mouth once daily. Historical Provider, MD Taking Active   docusate sodium (Colace) 100 mg capsule [id number] No Take 1 capsule (100 mg) by mouth twice a day. Historical Provider, MD Taking Active   ketoconazole (NIZOral) 2 % cream [id number] No Apply 1 Application topically 2 times a day as needed. Apply a thin layer to affected areas Historical  Provider, MD Taking Active   leuprolide, 6-month, (Lupron Depot) 45 mg injection [id number] No Inject 45 mg into the muscle. Historical Provider, MD Taking Active   meloxicam (Mobic) 15 mg tablet [id number] No Take 1 tablet (15 mg) by mouth if needed. Monday, Wednesday, and Friday Historical Provider, MD Taking Active   metoprolol succinate XL (Toprol-XL) 50 mg 24 hr tablet [id number] No Take 1 tablet (50 mg) by mouth once daily. Historical Provider, MD Taking Active   multivitamin tablet [id number] No Take by mouth. Historical Provider, MD Taking Active   nitroglycerin (Nitrostat) 0.4 mg SL tablet [id number] No Take 1 tablet (0.4 mg) by mouth every 5 minutes if needed. Historical Provider, MD Taking Active   pantoprazole (ProtoNix) 40 mg EC tablet [id number] No Take 1 tablet (40 mg) by mouth once daily. Historical Provider, MD Taking Active   polyethylene glycol (Glycolax, Miralax) 17 gram/dose powder [id number] No Take 17 g by mouth if needed. Historical Provider, MD Taking Active   predniSONE (Deltasone) 5 mg tablet [id number]  Take 1 tablet (5 mg) by mouth once daily. Angelita Bean APRN-CNP  Active   rosuvastatin (Crestor) 40 mg tablet [id number]  TAKE 1 TABLET(40 MG) BY MOUTH DAILY Nancy Cruz, APRN-CNP  Active   vitamin B complex (B Complex-Vitamin B12) tablet [id number] No Take by mouth. Historical Provider, MD Taking Active                    RX Allergies[3]    Social History     Socioeconomic History    Marital status:      Spouse name: Not on file    Number of children: Not on file    Years of education: Not on file    Highest education level: Not on file   Occupational History    Not on file   Tobacco Use    Smoking status: Never    Smokeless tobacco: Never   Vaping Use    Vaping status: Never Used   Substance and Sexual Activity    Alcohol use: Yes     Alcohol/week: 4.0 standard drinks of alcohol     Types: 4 Standard drinks or equivalent per week    Drug use: Never    Sexual activity: Not on file   Other  Topics Concern    Not on file   Social History Narrative    Not on file     Social Drivers of Health     Financial Resource Strain: Low Risk  (1/8/2025)    Received from [employer]    Overall Financial Resource Strain (CARDIA)     Difficulty of Paying Living Expenses: Not hard at all   Food Insecurity: No Food Insecurity (1/8/2025)    Received from [employer]    Hunger Vital Sign     Worried About Running Out of Food in the Last Year: Never true     Ran Out of Food in the Last Year: Never true   Transportation Needs: No Transportation Needs (1/8/2025)    Received from [employer]    PRAPARE - Transportation     Lack of Transportation (Medical): No     Lack of Transportation (Non-Medical): No   Physical Activity: Sufficiently Active (1/8/2025)    Received from [employer]    Exercise Vital Sign     Days of Exercise per Week: 5 days     Minutes of Exercise per Session: 60 min   Stress: No Stress Concern Present (1/8/2025)    Received from [employer]    [employer] - Occupational Stress Questionnaire     Feeling of Stress : Only a little   Social Connections: Socially Integrated (1/8/2025)    Received from [employer]    Social Connection and Isolation Panel [NHANES]     Frequency of Communication with Friends and Family: More than three times a week     Frequency of Social Gatherings with Friends and Family: More than three times a week     Attends Jain Services: More than 4 times per year     Active Member of Clubs or Organizations: Yes     Attends Club or Organization Meetings: More than 4 times per year     Marital Status:    Intimate Partner Violence: Not At Risk (1/8/2025)    Received from [employer]    Humiliation, Afraid, Rape, and Kick questionnaire     Fear of Current or Ex-Partner: No     Emotionally Abused: No     Physically Abused: No     Sexually Abused: No   Housing Stability: Low Risk  (1/8/2025)    Received from [employer]    Housing Stability Vital Sign      Unable to Pay for Housing in the Last Year: No     Number of Times Moved in the Last Year: 0     Homeless in the Last Year: No       Surgical History[4]    There is no height or weight on file to calculate BMI.    HgA1c:   Lab Results   Component Value Date    HGBA1C 6 (H) 01/09/2025    ZSIAQHOE2A 126 01/09/2025       Physical Exam:  Constitutional: Well-developed well-nourished   Eyes: Sclerae anicteric, pupils equal and round  HENT: Normocephalic atraumatic  Cardiovascular: Pulses full, regular rate and rhythm  Respiratory: Breathing not labored, no wheezing  Integumentary: Skin intact, no lesions or rashes  Neurological: Sensation intact, no gross strength deficits, reflexes equal  Psychiatric: Alert oriented and appropriate  Hematologic/lymphatic: No lymphadenopathy  Right hip: There is no leg discrepancy he has moderate striction mobility with reproduction of groin pain slight limp          Imaging:  X-rays of the hip show some arthritic changes of mild degree no distinct lesions        Impression/Plan:  Hip arthritis rule out metastatic disease given severity of symptoms and history of prostate CA check MRI       [1]   Past Medical History:  Diagnosis Date    Personal history of malignant neoplasm of prostate     History of malignant neoplasm of prostate    Personal history of other diseases of the circulatory system     History of abnormal electrocardiography    Personal history of other diseases of the circulatory system     History of pericarditis   [2]   Patient Active Problem List  Diagnosis    Advanced diabetic maculopathy (Multi)    ASHD (arteriosclerotic heart disease)    Benign essential hypertension    Shortness of breath    Hypercholesterolemia    Obesity with body mass index 30 or greater    Radiation cystitis    Status post non-ST elevation myocardial infarction (NSTEMI)    Chest discomfort    Urinary incontinence    Overactive bladder    Chest pain    Aortic valve stenosis    Hematuria    Lower  urinary tract symptoms    Prostate cancer (Multi)    Decrease in appetite    Pre-procedural cardiovascular examination    Urinary retention    Disorder of bladder    Cholelithiasis    Urinary catheter complication    Gross hematuria    Hyperlipoproteinemia    Low serum testosterone level in male    Intra-abdominal lymphadenopathy    Lymphadenopathy, mediastinal    Male urinary stress incontinence    Status post prostatectomy    Urinary catheter present    Myocardial infarction (Multi)    Abdominal pain    Condition not found    Class 1 obesity    Blocked urinary catheter    Acute posthemorrhagic anemia    Calculus of ureter    Contact with and (suspected) exposure to covid-19    Dyslipidemia    Gastroesophageal reflux disease    Elevated prostate specific antigen (PSA)    Hallux limitus    History of pericarditis    Hallux rigidus, right foot    History of skin cancer    History of skin cancer    Hydronephrosis    Hypertension    Hypertrophic obstructive cardiomyopathy (Multi)    Hypokalemia    Insomnia    Intermittent claudication (CMS-HCC)    Osteoarthritis of multiple joints    Pain in right foot    Right flank pain    Sepsis (Multi)    Spondylosis of lumbar spine    Arteriosclerosis of coronary artery    Low serum testosterone    Connective tissue and disc stenosis of intervertebral foramina of lumbar region    Osteopenia of multiple sites   [3] No Known Allergies  [4]   Past Surgical History:  Procedure Laterality Date    CT ANGIO CORONARY ART WITH HEARTFLOW IF SCORE >30%  7/8/2022    CT HEART CORONARY ANGIOGRAM 7/8/2022 [facility]

## 2025-04-23 ENCOUNTER — SPECIALTY PHARMACY (OUTPATIENT)
Dept: PHARMACY | Facility: CLINIC | Age: 75
End: 2025-04-23

## 2025-04-29 NOTE — PROGRESS NOTES
Patient ID: Moises Graham is a 75 y.o. male.  Attending Physician: Dr. Otoniel Wilson  Cancer Diagnosis:  Cancer Staging   No matching staging information was found for the patient.     Current Therapy:   ADT (Eligard 45 mg subcutaneous q24 weeks)  Abiraterone Acetate 1000 mg PO daily  Prednisone 5 mg PO daily    Subjective      Cancer History:  Oncology History   Prostate cancer (Multi)   8/18/2023 Initial Diagnosis    Malignant neoplasm of prostate (CMS/HCC)     12/13/2023 -  Chemotherapy    Abiraterone / PredniSONE, 84 Day Cycles       Prostate Cancer - [facility]  2012: RP with LND for intermediate-risk prostate cancer (iPSA/pT2c/GS7/negative margins and nodes). PSA was undetectable after surgery  2016: BCR and underwent salvage RT to prostate region, pre-RT PSA 2.39. No PSA response, serologic PD. He was started on ADT with Relugolix as part of a clinical trial, last 2018  7/2019: PSA purvi 0.6. Axumin PET without metastatic disease.  Late 2019: serologic PD and started again on ADT with Lupron. Initial PSA decline, however subsequent PSA rise, on observation. PSADT since 4-6 mos.   Summer 2021:   10/2021: PSMA PET revealed multiple lymph nodes (Pelvic/PRLNs and mediastinal LNs) without bone disease.   11/2021: Started ADT and AA/P with PSA response.  1/17/2023: PSA undetectable  4/28/2023: PSA undetectable  2023: negative genetic testing  7/2023: PSA undetectable  4/16/2025: HOLD AA/P due to fatigue  4/30/2025: Restart emeli due to no improvement in fatigue off    Other Providers:  Dr. Raghu Sotelo, PCP  Dr. Jayson Tang, cardiology  Dr. Victor Manuel Tang, urology    Interval History:  Mr. Graham presents today in follow up. He has been off emeli for two weeks. He may have felt a bit better after coming off for a day or two, but then his energy returned to normal. He is able to do exercise, and does about 15 minutes of the elliptical daily without issue. However, going up stairs or pushing shopping cart through store,  "notes he is more SOB. This has been going on over a year but is worsening over time. No acut events. Legs feel \"heavy\" though he notes no edema. No difficulty lying flat. PCP did PFT's which were normal. No other new or concerning symptoms. The remainder of his ROS is otherwise negative.  HPI    Objective    BSA: There is no height or weight on file to calculate BSA.  There were no vitals taken for this visit.    Physical Exam  This follow up visit was conducted via telephone (audio only) between the patient (at a distant site) and the provider (at the healthcare facility). Verbal consent was obtained from the patient on this date for a telehealth visit. He was unable or unwilling to do video visit at time of call.    Current Medications:    Current Outpatient Medications:   •  abiraterone (Zytiga) 250 mg tablet, TAKE FOUR (4) TABLETS BY MOUTH ONCE A DAY IN THE MORNING. NO FOOD OR DRINK 2 HOURS BEFORE OR 1 HOUR AFTER ADMINISTRATION., Disp: 120 tablet, Rfl: 3  •  amLODIPine (Norvasc) 5 mg tablet, TAKE 1 TABLET(5 MG) BY MOUTH DAILY, Disp: 90 tablet, Rfl: 3  •  aspirin 81 mg EC tablet, Take 1 tablet (81 mg) by mouth once daily., Disp: , Rfl:   •  calcium citrate-vitamin D2 250 mg-2.5 mcg (100 unit) tablet, Take 2 tablets by mouth 2 times a day., Disp: , Rfl:   •  cholecalciferol (Vitamin D3) 25 MCG (1000 UT) tablet, Take 2 tablets (2,000 Units) by mouth once daily., Disp: , Rfl:   •  coenzyme Q-10 (Co Q-10) 10 mg capsule, Take 1 capsule (10 mg) by mouth once daily., Disp: , Rfl:   •  docusate sodium (Colace) 100 mg capsule, Take 1 capsule (100 mg) by mouth twice a day., Disp: , Rfl:   •  ketoconazole (NIZOral) 2 % cream, Apply 1 Application topically 2 times a day as needed. Apply a thin layer to affected areas, Disp: , Rfl:   •  leuprolide, 6-month, (Lupron Depot) 45 mg injection, Inject 45 mg into the muscle., Disp: , Rfl:   •  meloxicam (Mobic) 15 mg tablet, Take 1 tablet (15 mg) by mouth if needed. Monday, " Wednesday, and Friday, Disp: , Rfl:   •  metoprolol succinate XL (Toprol-XL) 50 mg 24 hr tablet, Take 1 tablet (50 mg) by mouth once daily., Disp: , Rfl:   •  multivitamin tablet, Take by mouth., Disp: , Rfl:   •  nitroglycerin (Nitrostat) 0.4 mg SL tablet, Take 1 tablet (0.4 mg) by mouth every 5 minutes if needed., Disp: , Rfl:   •  pantoprazole (ProtoNix) 40 mg EC tablet, Take 1 tablet (40 mg) by mouth once daily., Disp: , Rfl:   •  polyethylene glycol (Glycolax, Miralax) 17 gram/dose powder, Take 17 g by mouth if needed., Disp: , Rfl:   •  predniSONE (Deltasone) 5 mg tablet, Take 1 tablet (5 mg) by mouth once daily., Disp: 30 tablet, Rfl: 3  •  rosuvastatin (Crestor) 40 mg tablet, TAKE 1 TABLET(40 MG) BY MOUTH DAILY, Disp: 90 tablet, Rfl: 1  •  vitamin B complex (B Complex-Vitamin B12) tablet, Take by mouth., Disp: , Rfl:      Most Recent Labs:  Results for orders placed or performed in visit on 04/09/25   Lipid Panel    Collection Time: 04/09/25  8:16 AM   Result Value Ref Range    CHOLESTEROL, TOTAL 149 <200 mg/dL    HDL CHOLESTEROL 58 > OR = 40 mg/dL    TRIGLYCERIDES 97 <150 mg/dL    LDL-CHOLESTEROL 73 mg/dL (calc)    CHOL/HDLC RATIO 2.6 <5.0 (calc)    NON HDL CHOLESTEROL 91 <130 mg/dL (calc)      Lab Results   Component Value Date    PSA <0.10 04/09/2025    PSA <0.10 04/09/2025    PSA <0.10 12/16/2024        Performance Status:  Symptomatic; fully ambulatory    Assessment/Plan   Moises Graham is a 75 y.o. male with metastatic prostate cancer to multiple LN's,  who presents in follow up ADT and AA/P. He looks and feels overall relatively well. Labs relatively unremarkable. PSA remains undetectable.    He is tolerating meds well, though does have fatigue. Fatigue was not relieved with a hold of emeli. Thus, likely multifactorial. Encouraged FUV with cardiologist to monitor. He is also following with ortho. Hip replacement/intervention may be helpful with increasing activity levels as well. Likely component of  fatigue is T suppression in general. Discussed risks/benefits of coming off therapy with progression of disease and he would no like to do this.    # Prostate cancer  - Continue ADT, again due October 2025  - Continue AA/P, as no improvement in fatigue with hold  - Continue to monitor PSA/T/CMP/CBC    # Fatigue  - Monitor  - Continue activity    # Hip pain  - Referral to ortho  - MRI pending    # Bone Health  - Continue calcium and vitamin D supplementation  - Continue regular, weight-bearing physical activity  - Continue prolia    # Health Maintenance  - Continue with PCP and other healthcare providers  - Continue exercise, heart-healthy diet    RTC 3 months with labs    Phone visit lasted 25 mins    Angelita Bean, MSN, APRN, AGNP-C, AOCNP  Associate Nurse Practitioner  [facility], [facility]

## 2025-04-30 ENCOUNTER — TELEMEDICINE (OUTPATIENT)
Dept: HEMATOLOGY/ONCOLOGY | Facility: HOSPITAL | Age: 75
End: 2025-04-30
Payer: MEDICARE

## 2025-04-30 DIAGNOSIS — C61 PROSTATE CANCER (MULTI): Primary | ICD-10-CM

## 2025-04-30 PROCEDURE — 1157F ADVNC CARE PLAN IN RCRD: CPT | Performed by: NURSE PRACTITIONER

## 2025-04-30 PROCEDURE — 99213 OFFICE O/P EST LOW 20 MIN: CPT | Performed by: NURSE PRACTITIONER

## 2025-05-02 ENCOUNTER — TELEPHONE (OUTPATIENT)
Dept: CARDIOLOGY | Facility: HOSPITAL | Age: 75
End: 2025-05-02
Payer: MEDICARE

## 2025-05-02 DIAGNOSIS — R06.02 SHORTNESS OF BREATH: Primary | ICD-10-CM

## 2025-05-02 DIAGNOSIS — R53.83 OTHER FATIGUE: ICD-10-CM

## 2025-05-02 NOTE — TELEPHONE ENCOUNTER
Mj called in at this time returning Deloris SEXTON call.     Routed to Deloris SEXTON for call back.    Thank you!  Micah HOYT   no

## 2025-05-02 NOTE — TELEPHONE ENCOUNTER
RN called pt at this time regarding Dr. Tang's directives. RN placed orders for pt at this time. Pt states he has been having to stop doing activizes and go a slower pace due to the SOB. Pt verbalized understanding.     ----- Message from Jayson Tang sent at 4/30/2025  2:57 PM EDT -----  Lets get a repeat echo and High resolution CT chest and follow up with me for SOB/fatigue  ----- Message -----  From: MELANIE Hamilton-CHET  Sent: 4/30/2025   1:40 PM EDT  To: MD Dr. Clyde Sparks,Mr. Graham sees me and Dr. Wilson for his prostate cancer. For the past year, he reports SOB with exertion. This is gradually worsening, along with increased fatigue. I suspect this is multifactorial, as his activities are somewhat limited by arthritis in the hip, for which he likely needs hip replacement. However, he is doing OK with exercise on the elliptical. I recently held his abiraterone for a bit over two weeks to see if this would help, and it did not. Thus, he will restart. Low testosterone can definitely be contributing, but we discussed risks and benefits of therapy and he'd like to stay on. Wanted to keep you in the loop in case there's a cardiac cause to this fatigue and PARK. He sees you in a couple weeks. Happy to help if I can.Thanks,Angelita Bean, NP with  medical oncology

## 2025-05-02 NOTE — TELEPHONE ENCOUNTER
RN called pt at this time regarding needing testing per Dr. Tang. No answer, RN left message for patient to call back.        ----- Message from Jayson Tang sent at 4/30/2025  2:57 PM EDT -----  Lets get a repeat echo and High resolution CT chest and follow up with me for SOB/fatigue  ----- Message -----  From: MELANIE Hamilton-CHET  Sent: 4/30/2025   1:40 PM EDT  To: MD Dr. Clyde Sparks,Mr. Graham sees me and Dr. Wilson for his prostate cancer. For the past year, he reports SOB with exertion. This is gradually worsening, along with increased fatigue. I suspect this is multifactorial, as his activities are somewhat limited by arthritis in the hip, for which he likely needs hip replacement. However, he is doing OK with exercise on the elliptical. I recently held his abiraterone for a bit over two weeks to see if this would help, and it did not. Thus, he will restart. Low testosterone can definitely be contributing, but we discussed risks and benefits of therapy and he'd like to stay on. Wanted to keep you in the loop in case there's a cardiac cause to this fatigue and PARK. He sees you in a couple weeks. Happy to help if I can.Thanks,Angelita Bean, NP with  medical oncology

## 2025-05-09 PROCEDURE — RXMED WILLOW AMBULATORY MEDICATION CHARGE

## 2025-05-12 ENCOUNTER — APPOINTMENT (OUTPATIENT)
Dept: CARDIOLOGY | Facility: CLINIC | Age: 75
End: 2025-05-12
Payer: MEDICARE

## 2025-05-14 ENCOUNTER — PHARMACY VISIT (OUTPATIENT)
Dept: PHARMACY | Facility: CLINIC | Age: 75
End: 2025-05-14
Payer: COMMERCIAL

## 2025-05-20 ENCOUNTER — HOSPITAL ENCOUNTER (OUTPATIENT)
Dept: RADIOLOGY | Facility: HOSPITAL | Age: 75
Discharge: HOME | End: 2025-05-20
Payer: MEDICARE

## 2025-05-20 ENCOUNTER — HOSPITAL ENCOUNTER (OUTPATIENT)
Dept: CARDIOLOGY | Facility: HOSPITAL | Age: 75
Discharge: HOME | End: 2025-05-20
Payer: MEDICARE

## 2025-05-20 DIAGNOSIS — R06.02 SHORTNESS OF BREATH: ICD-10-CM

## 2025-05-20 DIAGNOSIS — R53.83 OTHER FATIGUE: ICD-10-CM

## 2025-05-20 PROCEDURE — 93356 MYOCRD STRAIN IMG SPCKL TRCK: CPT | Performed by: INTERNAL MEDICINE

## 2025-05-20 PROCEDURE — 93356 MYOCRD STRAIN IMG SPCKL TRCK: CPT

## 2025-05-20 PROCEDURE — 93306 TTE W/DOPPLER COMPLETE: CPT | Performed by: INTERNAL MEDICINE

## 2025-05-20 PROCEDURE — 71250 CT THORAX DX C-: CPT

## 2025-05-20 PROCEDURE — 2500000004 HC RX 250 GENERAL PHARMACY W/ HCPCS (ALT 636 FOR OP/ED): Performed by: INTERNAL MEDICINE

## 2025-05-20 RX ADMIN — HUMAN ALBUMIN MICROSPHERES AND PERFLUTREN 0.5 ML: 10; .22 INJECTION, SOLUTION INTRAVENOUS at 14:05

## 2025-05-21 LAB
AORTIC VALVE MEAN GRADIENT: 12 MMHG
AORTIC VALVE PEAK VELOCITY: 2.41 M/S
AV PEAK GRADIENT: 23 MMHG
AVA (PEAK VEL): 2.8 CM2
AVA (VTI): 2.89 CM2
EJECTION FRACTION APICAL 4 CHAMBER: 67.6
EJECTION FRACTION: 69 %
GLOBAL LONGITUDINAL STRAIN: 16.3 %
LEFT ATRIUM VOLUME AREA LENGTH INDEX BSA: 24.5 ML/M2
LEFT VENTRICLE INTERNAL DIMENSION DIASTOLE: 5 CM (ref 3.5–6)
LEFT VENTRICULAR OUTFLOW TRACT DIAMETER: 2.14 CM
LV EJECTION FRACTION BIPLANE: 69 %
MITRAL VALVE E/A RATIO: 0.78
RIGHT VENTRICLE FREE WALL PEAK S': 11.26 CM/S
RIGHT VENTRICLE PEAK SYSTOLIC PRESSURE: 36.8 MMHG
TRICUSPID ANNULAR PLANE SYSTOLIC EXCURSION: 2.2 CM

## 2025-06-02 ENCOUNTER — APPOINTMENT (OUTPATIENT)
Dept: CARDIOLOGY | Facility: HOSPITAL | Age: 75
End: 2025-06-02
Payer: MEDICARE

## 2025-06-02 VITALS
DIASTOLIC BLOOD PRESSURE: 80 MMHG | HEART RATE: 60 BPM | BODY MASS INDEX: 35.71 KG/M2 | SYSTOLIC BLOOD PRESSURE: 126 MMHG | WEIGHT: 235.6 LBS | HEIGHT: 68 IN

## 2025-06-02 DIAGNOSIS — I10 BENIGN ESSENTIAL HYPERTENSION: ICD-10-CM

## 2025-06-02 DIAGNOSIS — I25.10 ASHD (ARTERIOSCLEROTIC HEART DISEASE): ICD-10-CM

## 2025-06-02 DIAGNOSIS — E78.00 HYPERCHOLESTEROLEMIA: ICD-10-CM

## 2025-06-02 DIAGNOSIS — R53.82 CHRONIC FATIGUE: Primary | ICD-10-CM

## 2025-06-02 DIAGNOSIS — E78.5 DYSLIPIDEMIA: ICD-10-CM

## 2025-06-02 PROCEDURE — 1036F TOBACCO NON-USER: CPT | Performed by: INTERNAL MEDICINE

## 2025-06-02 PROCEDURE — 93010 ELECTROCARDIOGRAM REPORT: CPT | Performed by: INTERNAL MEDICINE

## 2025-06-02 PROCEDURE — 99213 OFFICE O/P EST LOW 20 MIN: CPT | Performed by: INTERNAL MEDICINE

## 2025-06-02 PROCEDURE — 3079F DIAST BP 80-89 MM HG: CPT | Performed by: INTERNAL MEDICINE

## 2025-06-02 PROCEDURE — 3074F SYST BP LT 130 MM HG: CPT | Performed by: INTERNAL MEDICINE

## 2025-06-02 PROCEDURE — 93005 ELECTROCARDIOGRAM TRACING: CPT | Performed by: INTERNAL MEDICINE

## 2025-06-02 PROCEDURE — 1159F MED LIST DOCD IN RCRD: CPT | Performed by: INTERNAL MEDICINE

## 2025-06-02 PROCEDURE — 99213 OFFICE O/P EST LOW 20 MIN: CPT | Mod: 25 | Performed by: INTERNAL MEDICINE

## 2025-06-02 PROCEDURE — 1160F RVW MEDS BY RX/DR IN RCRD: CPT | Performed by: INTERNAL MEDICINE

## 2025-06-02 RX ORDER — DENOSUMAB 60 MG/ML
60 INJECTION SUBCUTANEOUS
COMMUNITY

## 2025-06-02 RX ORDER — ROSUVASTATIN CALCIUM 40 MG/1
40 TABLET, COATED ORAL DAILY
Qty: 90 TABLET | Refills: 3 | Status: SHIPPED | OUTPATIENT
Start: 2025-06-02 | End: 2026-06-02

## 2025-06-02 ASSESSMENT — ENCOUNTER SYMPTOMS: SHORTNESS OF BREATH: 1

## 2025-06-02 NOTE — PATIENT INSTRUCTIONS
Continue all current medications as prescribed.  Dr. Tang has ordered thyroid function studies to be drawn at the time of your next scheduled blood work.  Followup with Dr. Tang in 1 year, sooner should any issues or concerns arise before then.     If you have any questions or cardiac concerns, please call our office at [phone].

## 2025-06-02 NOTE — PROGRESS NOTES
"Counseling:  The patient was counseled regarding diagnostic results, instructions for management, risk factor reductions, prognosis, patient and family education, impressions, risks and benefits of treatment options and importance of compliance with treatment.      Chief Complaint:   The patient presents today for annual followup of CAD, HOCM, HTN and hyperlipidemia s/p echocardiogram and HR CT chest.      History Of Present Illness:    Moises Graham is a 75 year old male patient who presents today in the company of his wife for annual followup of CAD, HOCM, HTN and hyperlipidemia s/p echocardiogram and HR CT chest. His PMH is significant for advanced diabetic maculopathy, prostate CA, CAD with h/o NSTEMI s/p PCI of LAD 07/20/2022, HTN, and hyperlipidemia. On 05/20/2025, echocardiogram demonstrated an EF of 69%, Grade I impaired relaxation pattern of LV diastolic filling with normal left atrial filling pressure, normal RV systolic function, mildly elevated RVSP and LV strain of -16.3%, and HR CT chest revealed no evidence of ILD, no suspicious pulmonary nodules or masses and moderate coronary artery calcifications. Over the past year, the patient states that he has done well from a cardiac standpoint. He denies any CP or chest discomfort. He reports fatigue and persistent exertional SOB. Per the patient, he has been on androgen deprivation therapy for the past several years that has caused weight gain, and he wonders if this is contributing to his SOB and fatigue. BP has been stable. EKG today shows NSR with no acute changes. The patient is compliant with his prescribed medications.       Last Recorded Vitals:  Vitals:    06/02/25 1509   BP: 126/80   Pulse: 60   Weight: 107 kg (235 lb 9.6 oz)   Height: 1.727 m (5' 8\")       Past Surgical History:  He has a past surgical history that includes CT angio coronary art with heartflow if score >30% (7/8/2022).      Social History:  He reports that he has never smoked. He " has never used smokeless tobacco. He reports current alcohol use of about 4.0 standard drinks of alcohol per week. He reports that he does not use drugs.    Family History:  Family History   Family history unknown: Yes      Allergies:  Patient has no known allergies.    Outpatient Medications:  Current Outpatient Medications   Medication Instructions    abiraterone (Zytiga) 250 mg tablet TAKE FOUR (4) TABLETS BY MOUTH ONCE A DAY IN THE MORNING. NO FOOD OR DRINK 2 HOURS BEFORE OR 1 HOUR AFTER ADMINISTRATION.    amLODIPine (NORVASC) 5 mg, oral, Daily    aspirin 81 mg EC tablet 1 tablet, Daily    CALCIUM CITRATE ORAL 1 tablet, 2 times daily    calcium citrate-vitamin D2 250 mg-2.5 mcg (100 unit) tablet 2 tablets, 2 times daily    cholecalciferol (VITAMIN D3) 2,000 Units, Daily    coenzyme Q-10 (CO Q-10) 10 mg, Daily    docusate sodium (Colace) 100 mg capsule 1 capsule, 2 times daily    ketoconazole (NIZOral) 2 % cream 1 Application, 2 times daily PRN    leuprolide (6-month) (LUPRON DEPOT) 45 mg    meloxicam (Mobic) 15 mg tablet 1 tablet, As needed    metoprolol succinate XL (Toprol-XL) 50 mg 24 hr tablet 1 tablet, Daily    multivitamin tablet Take by mouth.    nitroglycerin (Nitrostat) 0.4 mg SL tablet 1 tablet, Every 5 min PRN    pantoprazole (ProtoNix) 40 mg EC tablet 1 tablet, Daily    polyethylene glycol (GLYCOLAX, MIRALAX) 17 g, As needed    predniSONE (DELTASONE) 5 mg, oral, Daily    Prolia 60 mg, Every 6 months    rosuvastatin (CRESTOR) 40 mg, oral, Daily    vitamin B complex (B Complex-Vitamin B12) tablet Take by mouth.     Review of Systems   Constitutional: Positive for malaise/fatigue.   Respiratory:  Positive for shortness of breath.    All other systems reviewed and are negative.     Physical Exam:  Constitutional:       Appearance: Healthy appearance. Not in distress.   Neck:      Vascular: No JVR. JVD normal.   Pulmonary:      Effort: Pulmonary effort is normal.      Breath sounds: Normal breath sounds.  No wheezing. No rhonchi. No rales.   Chest:      Chest wall: Not tender to palpatation.   Cardiovascular:      PMI at left midclavicular line. Normal rate. Regular rhythm. Normal S1. Normal S2.       Murmurs: There is no murmur.      No gallop.  No click. No rub.   Pulses:     Intact distal pulses.   Edema:     Peripheral edema absent.   Abdominal:      General: Bowel sounds are normal.      Palpations: Abdomen is soft.      Tenderness: There is no abdominal tenderness.   Musculoskeletal: Normal range of motion.         General: No tenderness. Skin:     General: Skin is warm and dry.   Neurological:      General: No focal deficit present.      Mental Status: Alert and oriented to person, place and time.        Last Labs:  CBC -  Lab Results   Component Value Date    WBC 6.4 04/09/2025    HGB 13.2 (L) 04/09/2025    HCT 39.6 (L) 04/09/2025    MCV 92 04/09/2025     04/09/2025       CMP -  Lab Results   Component Value Date    CALCIUM 9.0 04/09/2025    PROT 6.5 04/09/2025    ALBUMIN 4.4 04/09/2025    AST 24 04/09/2025    ALT 15 04/09/2025    ALKPHOS 88 04/09/2025    BILITOT 1.6 (H) 04/09/2025       LIPID PANEL -   Lab Results   Component Value Date    CHOL 149 04/09/2025    TRIG 97 04/09/2025    HDL 58 04/09/2025    CHHDL 2.6 04/09/2025    LDLF 81 04/28/2023    VLDL 16 01/10/2024    NHDL 91 04/09/2025       RENAL FUNCTION PANEL -   Lab Results   Component Value Date    GLUCOSE 130 (H) 04/09/2025     04/09/2025    K 4.3 04/09/2025     04/09/2025    CO2 29 04/09/2025    ANIONGAP 14 04/09/2025    BUN 16 04/09/2025    CREATININE 0.85 04/09/2025    GFRMALE >90 09/18/2023    CALCIUM 9.0 04/09/2025    ALBUMIN 4.4 04/09/2025        Lab Results   Component Value Date    BNP 56 06/21/2022    HGBA1C 6 (H) 01/09/2025       Last Cardiology Tests:  05/20/2025 - TTE  1. The left ventricular systolic function is normal, with a Whatley's biplane calculated ejection fraction of 69%.  2. Spectral Doppler shows a Grade I  (impaired relaxation pattern) of left ventricular diastolic filling with normal left atrial filling pressure.  3. There is normal right ventricular global systolic function.  4. Mildly elevated right ventricular systolic pressure.  5. Aortic valve stenosis is not present.  6. Left Ventricular Global Longitudinal Strain - 16.3%.    05/20/2025 - HR CT Chest  1. No evidence of acute pathologyor interstitial lung disease.  2. Expiratory images demonstrate no evidence of significant air trapping.  3. No suspicious pulmonary nodules or masses.  4. Moderate coronary artery calcifications with suspected LAD stent, indicating the presence of coronary artery disease.     11/02/2023 - Vascular Lab PVR DIANNE  1. Right Lower PVR: No evidence of arterial occlusive disease in the right lower extremity at rest. Normal digital perfusion noted. Triphasic flow is noted in the right common femoral artery, right posterior tibial artery and right dorsalis pedis artery. Results called by waveforms/tracings due to partially non-compressible vessels.  2. Left Lower PVR: No evidence of arterial occlusive disease in the left lower extremity at rest. Normal digital perfusion noted. Triphasic flow is noted in the left common femoral artery, left posterior tibial artery and left dorsalis pedis artery.     11/02/2023 - Cardiac MRI  1. There is mild thickening at the level of the basal septum. Maximal wall thickness 1.2 cm. No Bj or subaortic membrane. Normal LV mass. Peak provoked LV outflow tract gradient 27 mm Hg. Differential diagnosis include ventricular septal bulge for longstanding hypertension or less likely a subtle form of hypertrophic obstructive cardiomyopathy.  2. There are no findings to suggest prior ischemic damage or an infiltrative process.    09/22/2023 - Cardiac Catheterization (LH)  1. Mild non-obstructive coronary artery disease.  2. Left Ventricular end-diastolic pressure = 7.  3. Normal LV filling pressures.    09/22/2023 -  TTE  1. Left ventricular systolic function is normal with a 60-65% estimated ejection fraction.  2. Spectral Doppler shows an impaired relaxation pattern of left ventricular diastolic filling.  3. Moderately elevated right ventricular systolic pressure.  4. There is basal septal hypertrophy(sigmoid septum).  5. Mild mitral valve regurgitation.  6. Aortic valve area of 2.27 cm2 (1.06 indexed ZARA) by continuity equation. Peak velocity of 2.27 m/s across AV. Peak and mean gradient of 20 mmHg and 12 mmHg respectively across the AV. The gradient appears to be predominantly subaortic at the level of LVOT due to sigmoid shaped septum. There is elevated flow across LVOT as suggested by elevated LVOT VTI. Can consider repeat study with valsalva to evaluate for provoked gradients if clinically indicated. Aortic valve DI of 0.72 and strove volume index of 40 L/min/m2.    01/25/2023 - TTE  1. Left ventricular systolic function is normal with a 55-60% estimated ejection fraction.  2. Spectral Doppler shows a pseudonormal pattern of left ventricular diastolic filling.  3. There is moderate concentric left ventricular hypertrophy.  4. Mild aortic valve stenosis.    01/05/2023 - Stress Test  1. No clinical evidence for ischemia at maximal workload.  2. Normal perfusion without evidence of ischemia or prior infarct. Calculated ejection fraction is 66% without segmental wall motion abnormality seen.     07/20/2022 - Cardiac Catheterization (LH)  1. Severe 1-vessel CAD involving mid LAD (RFR 0.86).  2. Mid LAD bridge.  3. Successful PCI of hemodynamically significant mid LAD 70% stenosis (RFR 0.86) with 3.5 x 18 mm Resolute [place] stent post-dilated with 3.5 mm NC balloon.    06/15/2022 - Onco-Cardiology TTE  1. The left ventricular systolic function is normal with a 65-70% estimated ejection fraction.  2. Spectral Doppler shows an impaired relaxation pattern of left ventricular diastolic filling.  3. RVSP within normal  limits.  4. Compared with the prior exam from 9/14/2016 the LV function remains unchanged. GLS was not assessed on the prior study.    06/29/2020 - Stress Test  1. Borderline ST response to moderate peak workload max. ETT. Occ. PVC's with exercise. Systolic and diastolic hypertensive response to exercise.   2. Normal stress myocardial perfusion imaging. Well-maintained left ventricular function. 64%     Lab review: I have personally reviewed the laboratory result(s).   Diagnostic review: I have personally reviewed the result(s) of the Echocardiogram.     Assessment/Plan   1) CAD s/p PCI of LAD in 7/2022 - HOCM  On ASA 81 mg daily, rosuvastatin 40 mg daily, amlodipine 5 mg daily, metoprolol succinate 50 mg daily   Plavix was previously discontinued s/p hematuria on DAPT and risks of anticoagulation outweigh the benefits  Stress 01/25/2023 negative for ischemia  TTE 01/25/2023 with LVEF 55-60, moderate concentric LVH and mild AS  TTE 09/22/2023 with LVEF 60-65%, basal septal hypertrophy (sigmoid septum). mild MR; aortic valve area of 2.27 cm2, peak velocity of 2.27 m/s across AV, peak and mean gradient of 20 mmHg and 12 mmHg respectively across the AV, gradient appears to be predominantly subaortic at the level of LVOT due to sigmoid shaped septum. With elevated flow across LVOT as suggested by elevated LVOT VTI.   C 09/22/2023 with mild non-obstructive CAD  Cardiac MRI 11/02/2023 with mild thickening at the level of the basal septum (maximal wall thickness 1.2 cm) and peak provoked LV outflow tract gradient of 27 mm Hg with differential diagnosis including ventricular septal bulge for longstanding hypertension or less likely a subtle form of hypertrophic obstructive cardiomyopathy.  CTA chest 08/2023 with findings suggestive of pulmonary fibrosis - previously recommended to f/u with PCP re: pulmonology referral.  HR CT chest 05/20/2025 with no evidence of ILD, no suspicious pulmonary nodules or masses, moderate  coronary artery calcifications.   TTE 05/20/2025 with LVEF 69%, Grade I impaired relaxation pattern of LV diastolic filling with normal left atrial filling pressure, normal RV systolic function, mildly elevated RVSP, LV strain of -16.3%.  Denies CP or discomfort  Reports fatigue  Reports persistent exertional SOB and exercise intolerance   Has been on androgen deprivation therapy that has caused weight gain - patient wonders if this is contributing to symptoms.   Continue current medical Rx   Check TSH  F/U 1 year     2) Hyperlipidemia  Goal LDL <70  On rosuvastatin 40 mg daily   Atorvastatin previously discontinued  Lipid panel 04/09/2025 with LDL of 73  Continue current medical Rx   F/U 1 year     3) HTN  Stable  On amlodipine 5 mg, metoprolol succinate 50 mg daily   Furosemide previously discontinued s/t urinary frequency   Continue current medical Rx   F/U 1 year     4) BLE Discomfort and Intermittent Claudication   Able to use Elliptical  Unable to tolerate treadmill  DIANNE 11/02/2023 negative for arterial occlusive disease bilaterally   Symptoms likely MSK related, ?arthritis - previously recommended to followup with PCP re: orthopedic referral      Scribe Attestation  By signing my name below, I, Sharlene Calderon, attest that this documentation has been prepared under the direction and in the presence of Jayson Tang MD.

## 2025-06-04 ENCOUNTER — SPECIALTY PHARMACY (OUTPATIENT)
Dept: PHARMACY | Facility: CLINIC | Age: 75
End: 2025-06-04

## 2025-06-05 LAB
ATRIAL RATE: 60 BPM
P AXIS: 48 DEGREES
P OFFSET: 187 MS
P ONSET: 123 MS
PR INTERVAL: 188 MS
Q ONSET: 217 MS
QRS COUNT: 10 BEATS
QRS DURATION: 100 MS
QT INTERVAL: 434 MS
QTC CALCULATION(BAZETT): 434 MS
QTC FREDERICIA: 434 MS
R AXIS: 5 DEGREES
T AXIS: 8 DEGREES
T OFFSET: 434 MS
VENTRICULAR RATE: 60 BPM

## 2025-06-06 ENCOUNTER — SPECIALTY PHARMACY (OUTPATIENT)
Dept: PHARMACY | Facility: CLINIC | Age: 75
End: 2025-06-06

## 2025-06-06 PROCEDURE — RXMED WILLOW AMBULATORY MEDICATION CHARGE

## 2025-06-13 ENCOUNTER — PHARMACY VISIT (OUTPATIENT)
Dept: PHARMACY | Facility: CLINIC | Age: 75
End: 2025-06-13
Payer: COMMERCIAL

## 2025-07-02 ENCOUNTER — SPECIALTY PHARMACY (OUTPATIENT)
Dept: PHARMACY | Facility: CLINIC | Age: 75
End: 2025-07-02

## 2025-07-02 DIAGNOSIS — C61 MALIGNANT NEOPLASM OF PROSTATE (MULTI): ICD-10-CM

## 2025-07-02 RX ORDER — PREDNISONE 5 MG/1
5 TABLET ORAL DAILY
Qty: 30 TABLET | Refills: 3 | Status: SHIPPED | OUTPATIENT
Start: 2025-07-02 | End: 2026-06-27

## 2025-07-02 RX ORDER — ABIRATERONE ACETATE 250 MG/1
TABLET ORAL
Qty: 120 TABLET | Refills: 3 | Status: SHIPPED | OUTPATIENT
Start: 2025-07-02 | End: 2026-07-02

## 2025-07-11 DIAGNOSIS — C61 PROSTATE CANCER (MULTI): ICD-10-CM

## 2025-07-11 PROCEDURE — 84403 ASSAY OF TOTAL TESTOSTERONE: CPT

## 2025-07-11 PROCEDURE — 84153 ASSAY OF PSA TOTAL: CPT

## 2025-07-11 PROCEDURE — 80053 COMPREHEN METABOLIC PANEL: CPT

## 2025-07-11 PROCEDURE — 85025 COMPLETE CBC W/AUTO DIFF WBC: CPT

## 2025-07-12 ENCOUNTER — LAB (OUTPATIENT)
Dept: LAB | Facility: HOSPITAL | Age: 75
End: 2025-07-12
Payer: MEDICARE

## 2025-07-12 DIAGNOSIS — C61 MALIGNANT NEOPLASM OF PROSTATE (MULTI): Primary | ICD-10-CM

## 2025-07-12 LAB
ALBUMIN SERPL BCP-MCNC: 4.2 G/DL (ref 3.4–5)
ALP SERPL-CCNC: 61 U/L (ref 33–136)
ALT SERPL W P-5'-P-CCNC: 14 U/L (ref 10–52)
ANION GAP SERPL CALC-SCNC: 14 MMOL/L (ref 10–20)
AST SERPL W P-5'-P-CCNC: 24 U/L (ref 9–39)
BASOPHILS # BLD AUTO: 0.04 X10*3/UL (ref 0–0.1)
BASOPHILS NFR BLD AUTO: 0.6 %
BILIRUB SERPL-MCNC: 1.3 MG/DL (ref 0–1.2)
BUN SERPL-MCNC: 23 MG/DL (ref 6–23)
CALCIUM SERPL-MCNC: 8.8 MG/DL (ref 8.6–10.6)
CHLORIDE SERPL-SCNC: 107 MMOL/L (ref 98–107)
CO2 SERPL-SCNC: 25 MMOL/L (ref 21–32)
CREAT SERPL-MCNC: 0.65 MG/DL (ref 0.5–1.3)
EGFRCR SERPLBLD CKD-EPI 2021: >90 ML/MIN/1.73M*2
EOSINOPHIL # BLD AUTO: 0.14 X10*3/UL (ref 0–0.4)
EOSINOPHIL NFR BLD AUTO: 2 %
ERYTHROCYTE [DISTWIDTH] IN BLOOD BY AUTOMATED COUNT: 13.5 % (ref 11.5–14.5)
GLUCOSE SERPL-MCNC: 96 MG/DL (ref 74–99)
HCT VFR BLD AUTO: 40.1 % (ref 41–52)
HGB BLD-MCNC: 12.7 G/DL (ref 13.5–17.5)
HOLD SPECIMEN: NORMAL
IMM GRANULOCYTES # BLD AUTO: 0.03 X10*3/UL (ref 0–0.5)
IMM GRANULOCYTES NFR BLD AUTO: 0.4 % (ref 0–0.9)
LYMPHOCYTES # BLD AUTO: 1.78 X10*3/UL (ref 0.8–3)
LYMPHOCYTES NFR BLD AUTO: 25 %
MCH RBC QN AUTO: 29.6 PG (ref 26–34)
MCHC RBC AUTO-ENTMCNC: 31.7 G/DL (ref 32–36)
MCV RBC AUTO: 94 FL (ref 80–100)
MONOCYTES # BLD AUTO: 0.54 X10*3/UL (ref 0.05–0.8)
MONOCYTES NFR BLD AUTO: 7.6 %
NEUTROPHILS # BLD AUTO: 4.59 X10*3/UL (ref 1.6–5.5)
NEUTROPHILS NFR BLD AUTO: 64.4 %
NRBC BLD-RTO: 0 /100 WBCS (ref 0–0)
PLATELET # BLD AUTO: 196 X10*3/UL (ref 150–450)
POTASSIUM SERPL-SCNC: 4.1 MMOL/L (ref 3.5–5.3)
PROT SERPL-MCNC: 6.2 G/DL (ref 6.4–8.2)
PSA SERPL-MCNC: <0.1 NG/ML
RBC # BLD AUTO: 4.29 X10*6/UL (ref 4.5–5.9)
SODIUM SERPL-SCNC: 142 MMOL/L (ref 136–145)
WBC # BLD AUTO: 7.1 X10*3/UL (ref 4.4–11.3)

## 2025-07-14 ENCOUNTER — SPECIALTY PHARMACY (OUTPATIENT)
Dept: PHARMACY | Facility: CLINIC | Age: 75
End: 2025-07-14

## 2025-07-14 LAB
HOLD SPECIMEN: NORMAL
TESTOST SERPL-MCNC: <30 NG/DL (ref 240–1000)

## 2025-07-14 PROCEDURE — RXMED WILLOW AMBULATORY MEDICATION CHARGE

## 2025-07-17 ENCOUNTER — PHARMACY VISIT (OUTPATIENT)
Dept: PHARMACY | Facility: CLINIC | Age: 75
End: 2025-07-17
Payer: COMMERCIAL

## 2025-07-24 ENCOUNTER — OFFICE VISIT (OUTPATIENT)
Dept: HEMATOLOGY/ONCOLOGY | Facility: HOSPITAL | Age: 75
End: 2025-07-24
Payer: MEDICARE

## 2025-07-24 VITALS
DIASTOLIC BLOOD PRESSURE: 66 MMHG | BODY MASS INDEX: 35.29 KG/M2 | OXYGEN SATURATION: 98 % | RESPIRATION RATE: 18 BRPM | SYSTOLIC BLOOD PRESSURE: 140 MMHG | TEMPERATURE: 96.8 F | WEIGHT: 232.1 LBS | HEART RATE: 56 BPM

## 2025-07-24 DIAGNOSIS — M25.551 RIGHT HIP PAIN: ICD-10-CM

## 2025-07-24 DIAGNOSIS — M85.89 OSTEOPENIA OF MULTIPLE SITES: ICD-10-CM

## 2025-07-24 DIAGNOSIS — R59.1 LYMPHADENOPATHY: ICD-10-CM

## 2025-07-24 DIAGNOSIS — C61 PROSTATE CANCER (MULTI): Primary | ICD-10-CM

## 2025-07-24 DIAGNOSIS — R79.89 LOW SERUM TESTOSTERONE LEVEL IN MALE: ICD-10-CM

## 2025-07-24 DIAGNOSIS — C61 MALIGNANT NEOPLASM OF PROSTATE (MULTI): ICD-10-CM

## 2025-07-24 PROCEDURE — 1126F AMNT PAIN NOTED NONE PRSNT: CPT | Performed by: INTERNAL MEDICINE

## 2025-07-24 PROCEDURE — 1159F MED LIST DOCD IN RCRD: CPT | Performed by: INTERNAL MEDICINE

## 2025-07-24 PROCEDURE — 99214 OFFICE O/P EST MOD 30 MIN: CPT | Performed by: INTERNAL MEDICINE

## 2025-07-24 PROCEDURE — 3077F SYST BP >= 140 MM HG: CPT | Performed by: INTERNAL MEDICINE

## 2025-07-24 PROCEDURE — 3078F DIAST BP <80 MM HG: CPT | Performed by: INTERNAL MEDICINE

## 2025-07-24 ASSESSMENT — PAIN SCALES - GENERAL: PAINLEVEL_OUTOF10: 0-NO PAIN

## 2025-07-24 NOTE — PROGRESS NOTES
Medical Oncology Out Patient Clinic Note    Patient's Name: Moises Graham  MRN: [id number]  Date of Service: 07/24/25  In- Person Visit: YES  Virtual Visit:  Phone Visit:    Reason for Visit: FU    HPI:     Prostate Cancer - [facility]  2012: RP with LND for intermediate-risk prostate cancer (iPSA/pT2c/GS7/negative margins and nodes). PSA was undetectable after surgery  2016: BCR and underwent salvage RT to prostate region, pre-RT PSA 2.39. No PSA response, serologic PD. He was started on ADT with Relugolix as part of a clinical trial, last 2018  7/2019: PSA purvi 0.6. Axumin PET without metastatic disease.  Late 2019: serologic PD and started again on ADT with Lupron. Initial PSA decline, however subsequent PSA rise, on observation. PSADT since 4-6 mos.   Summer 2021:   10/2021: PSMA PET revealed multiple lymph nodes (Pelvic/PRLNs and mediastinal LNs) without bone disease.   11/2021: Started ADT and AA/P with PSA response.  1/17/2023: PSA undetectable  4/28/2023: PSA undetectable  2023: negative genetic testing  7/2023: PSA undetectable  4/16/2025: HOLD AA/P due to fatigue  4/30/2025: Restart emeli due to no improvement in fatigue off     Other Providers:  Dr. Raghu Sotelo, PCP  Dr. Jayson Tang, cardiology  Dr. Victor Manuel Tang, urology    Today, he reports his fatigue continues, but has not changed since restarting abiraterone. He describes it as increasing sleepiness, some SOB on exertion, and states his pulmonology and cardiology workup have been unrevealing. He thinks it is ultimately due to low testosterone levels. Otherwise has some mild pitting edema in his ankles.     Updated PMHx  None    Review of Systems  13 point review negative    Physical Exam:  /66 (BP Location: Left arm, Patient Position: Sitting, BP Cuff Size: Adult)   Pulse 56   Temp 36 °C (96.8 °F) (Temporal)   Resp 18   Wt 105 kg (232 lb 1.6 oz)   SpO2 98%   BMI 35.29 kg/m²     ECOG Performance Status: 1    Gen: well-developed man in no  acute distress  Eyes: anicteric, no injection, no discharge, pupils equal and reactive  HENT: NCAT, no discharge from orifices, oral mucosa moist, dentition adequate, no masses in neck  Heart: RRR, no m/r/g, no JVD, 1+ pitting edema in BL Les at ankles only, non-tender  Lungs: CTAB with vesicular breath sounds, no rhonchi/rales/wheezes/crackles, normal effort on RA  Abdo: soft, non-distended, BS normal, no TTP, no masses or organomegaly  MSK: no deformities of soft tissues or joints  Neuro: Aox4  Skin: warm, dry, and intact throughout  Psych: appropriate mood and affect, no phobias/delusions/hallucinations    LABS:  No results found for this or any previous visit (from the past 96 hours).    IMAGING:    None    GENOMICS:    Germline: NEGATIVE    Somatic: Not obtained    A/P:    Moises Graham is a 75 y.o. male with metastatic prostate cancer to multiple LN's,  who presents in follow up ADT and AA/P. He looks and feels overall relatively well. Labs relatively unremarkable. PSA remains undetectable.     He recently had a holiday from abiraterone/prednisone with workup by pulm and cardiology for his persistent fatigue. He experienced no improvement in his symptoms during this period, and has now restarted his medications, also with no change. It is likely that his symptoms are due to ADT and low testosterone state.      # Prostate cancer  - germline genetic testing with no variants identified  - somatic genetic testing not done as RP was in 2012, will not pursue unless progression  - Continue lupron [id number], again due October 2025  - Continue abiraterone and prednisone 5mg qAM  - Continue to monitor [id number] PSA/T/CMP/CBC     # Fatigue  - somnolence, reduced tolerance of exertion, some SOB on exertion  - cardiology: relatively normal workup with history of mild CAD s/p stenting, unlikely cardiac etiology  - pulmonology referral placed by cardiology, but CT chest without notable pathology and PFTs showing mild  restrictive disease with preserved DLCO, possibly obesity-related  - given ADT with low testosterone, weight gain, osteopenia, 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     # Hip pain  - Referral to ortho  - MRI pending     # Bone Health  - DEXA in 8/2024 showed  L femur T score -2.1, osteopenia with 10 year frax risk of 24.0%  - Continue calcium and vitamin D supplementation  - Continue regular, weight-bearing physical activity  - Continue prolia [id number]     # Health Maintenance  - Continue with PCP and other healthcare providers  - Continue exercise, heart-healthy diet     RTC 3 months with labs    Discussed importance of a healthier diet - offered to see Nutritional Services; Also discussed the importance of daily regular exercise (aerobic and resistance differences noted)  Offered to see Supportive Services if needed as well as integrative Oncology and Psychosocial Support  TEACHING ATTENDING ATTESTATION    I saw and evaluated this patient with Resident/Fellow. I reviewed the resident/fellow's documentation and discussed the patient with the resident/fellow. I agree with the resident/fellow's medical decision making as documented in the note.     Serologic response  Clinically doing well  RTC In Oct with labs for LHRH and BHAs    Otoniel Wilson MD, FACP  Chief, Solid Tumor Oncology Division   Medical Oncology  Professor of Medicine and Urology  /[facility]

## 2025-08-11 ENCOUNTER — PHARMACY VISIT (OUTPATIENT)
Dept: PHARMACY | Facility: CLINIC | Age: 75
End: 2025-08-11
Payer: COMMERCIAL

## 2025-08-11 ENCOUNTER — SPECIALTY PHARMACY (OUTPATIENT)
Dept: PHARMACY | Facility: CLINIC | Age: 75
End: 2025-08-11

## 2025-08-11 PROCEDURE — RXMED WILLOW AMBULATORY MEDICATION CHARGE

## 2025-08-27 ENCOUNTER — APPOINTMENT (OUTPATIENT)
Dept: HEMATOLOGY/ONCOLOGY | Facility: HOSPITAL | Age: 75
End: 2025-08-27
Payer: MEDICARE

## 2025-10-13 ENCOUNTER — APPOINTMENT (OUTPATIENT)
Dept: HEMATOLOGY/ONCOLOGY | Facility: HOSPITAL | Age: 75
End: 2025-10-13
Payer: MEDICARE